# Patient Record
Sex: FEMALE | Race: WHITE | Employment: UNEMPLOYED | URBAN - NONMETROPOLITAN AREA
[De-identification: names, ages, dates, MRNs, and addresses within clinical notes are randomized per-mention and may not be internally consistent; named-entity substitution may affect disease eponyms.]

---

## 2023-07-12 ENCOUNTER — NURSE ONLY (OUTPATIENT)
Dept: LAB | Age: 33
End: 2023-07-12

## 2023-07-17 LAB
C. TRACHOMATIS DNA,THIN PREP: NEGATIVE
N. GONORRHOEAE DNA, THIN PREP: NEGATIVE
SOURCE: NORMAL
SPEC CONTAINER SPEC: NORMAL
SPECIMEN SOURCE: NORMAL
T VAGINALIS RRNA SPEC QL NAA+PROBE: NEGATIVE

## 2023-07-19 LAB — CYTOLOGY THIN PREP PAP: NORMAL

## 2024-01-04 ENCOUNTER — HOSPITAL ENCOUNTER (EMERGENCY)
Age: 34
Discharge: HOME | End: 2024-01-04
Payer: COMMERCIAL

## 2024-01-04 VITALS
SYSTOLIC BLOOD PRESSURE: 127 MMHG | OXYGEN SATURATION: 99 % | HEART RATE: 88 BPM | DIASTOLIC BLOOD PRESSURE: 67 MMHG | RESPIRATION RATE: 18 BRPM | TEMPERATURE: 98.4 F

## 2024-01-04 DIAGNOSIS — L72.3 SEBACEOUS CYST: Primary | ICD-10-CM

## 2024-01-04 PROCEDURE — 99213 OFFICE O/P EST LOW 20 MIN: CPT

## 2024-01-04 PROCEDURE — 99202 OFFICE O/P NEW SF 15 MIN: CPT | Performed by: NURSE PRACTITIONER

## 2024-01-04 ASSESSMENT — ENCOUNTER SYMPTOMS
CHEST TIGHTNESS: 0
STRIDOR: 0
CHOKING: 0
APNEA: 0
NAUSEA: 0
WHEEZING: 0
VOMITING: 0
COUGH: 0
SHORTNESS OF BREATH: 0

## 2024-01-04 ASSESSMENT — PAIN - FUNCTIONAL ASSESSMENT: PAIN_FUNCTIONAL_ASSESSMENT: NONE - DENIES PAIN

## 2024-01-04 NOTE — DISCHARGE INSTRUCTIONS
The patient/Patient representative was advised to rest at home and elevate the affected area.  Patient/representative was also advised to apply warm compresses to the affected area up to 15/20 minutes at a time up to 4 times a day.   Patient/Patient representative is also advised to monitor any changes such as increasing redness, pain, development of fever or chills, generalized body aches.  The patient will be given medication and is advised to take as directed.   If the patient develops any changes such as fever not relieved with Motrin and Tylenol chest pain or shortness of breath patient is to dial 911 or go directly to the emergency room for reevaluation and further management of care.  The patient does not experience any of these symptoms or concerns.  Follow-up with her primary care provider in 2-3 days for reevaluation.  The patient/Patient representatives are agreeable to treatment plan at this time and the patient left in stable condition.

## 2024-01-04 NOTE — ED TRIAGE NOTES
To room with c/o \"lump\" right posterior rib cage she noticed Tuesday. Little to no pain. Saw her OBYN today and they suggested she have it evaluated.

## 2024-01-04 NOTE — ED PROVIDER NOTES
[facility] URGENT CARE  Urgent Care Encounter      CHIEF COMPLAINT       Chief Complaint   Patient presents with    Mass       Nurses Notes reviewed and I agree except as noted in the HPI.  HISTORY OFPRESENT ILLNESS   Dedra Beaulieu is a 33 y.o.  The history is provided by the patient. No  was used.   Abscess  Location:  Torso  Torso abscess location:  R flank  Size:  3 cm  Abscess quality: induration    Abscess quality: not draining, no fluctuance, no itching, not painful, no redness, no warmth and not weeping    Red streaking: no    Duration:  2 days  Progression:  Unchanged  Chronicity:  New  Context: not diabetes, not immunosuppression, not injected drug use, not insect bite/sting and not skin injury    Relieved by:  Nothing  Worsened by:  Nothing  Ineffective treatments:  None tried  Associated symptoms: no anorexia, no fatigue, no fever, no headaches, no nausea and no vomiting    Risk factors: no family hx of MRSA, no hx of MRSA and no prior abscess        REVIEW OF SYSTEMS     Review of Systems   Constitutional:  Negative for activity change, appetite change, chills, diaphoresis, fatigue and fever.   Respiratory:  Negative for apnea, cough, choking, chest tightness, shortness of breath, wheezing and stridor.    Cardiovascular:  Negative for chest pain, palpitations and leg swelling.   Gastrointestinal:  Negative for anorexia, nausea and vomiting.   Neurological:  Negative for headaches.       PAST MEDICAL HISTORY   History reviewed. No pertinent past medical history.    SURGICAL HISTORY     Patient  has a past surgical history that includes Dilation and curettage of uterus (N/A, 2/26/2023).    CURRENT MEDICATIONS       Previous Medications    PRENATAL MV-MIN-FE FUM-FA-DHA (PRENATAL 1 PO)    Take by mouth       ALLERGIES     Patient is has No Known Allergies.    FAMILY HISTORY     Patient's family history includes Depression in her mother; Diabetes in her mother; High Blood

## 2024-01-08 ENCOUNTER — OFFICE VISIT (OUTPATIENT)
Dept: FAMILY MEDICINE CLINIC | Age: 34
End: 2024-01-08
Payer: COMMERCIAL

## 2024-01-08 VITALS
DIASTOLIC BLOOD PRESSURE: 70 MMHG | HEIGHT: 65 IN | HEART RATE: 91 BPM | TEMPERATURE: 97.7 F | BODY MASS INDEX: 41.22 KG/M2 | WEIGHT: 247.4 LBS | RESPIRATION RATE: 16 BRPM | OXYGEN SATURATION: 98 % | SYSTOLIC BLOOD PRESSURE: 110 MMHG

## 2024-01-08 DIAGNOSIS — M54.50 ACUTE BILATERAL LOW BACK PAIN, UNSPECIFIED WHETHER SCIATICA PRESENT: ICD-10-CM

## 2024-01-08 DIAGNOSIS — Z3A.34 34 WEEKS GESTATION OF PREGNANCY: Primary | ICD-10-CM

## 2024-01-08 DIAGNOSIS — L72.3 SEBACEOUS CYST: ICD-10-CM

## 2024-01-08 DIAGNOSIS — R42 DIZZINESS: ICD-10-CM

## 2024-01-08 DIAGNOSIS — R10.84 GENERALIZED ABDOMINAL PAIN: ICD-10-CM

## 2024-01-08 LAB
BILIRUBIN URINE: NEGATIVE
BLOOD URINE, POC: NEGATIVE
CHARACTER, URINE: ABNORMAL
CHP ED QC CHECK: NORMAL
COLOR, URINE: ABNORMAL
GLUCOSE BLD-MCNC: 141 MG/DL
GLUCOSE URINE: NEGATIVE MG/DL
KETONES, URINE: 15
LEUKOCYTE CLUMPS, URINE: NEGATIVE
NITRITE, URINE: NEGATIVE
PH, URINE: 5.5 (ref 5–9)
PROTEIN, URINE: 100 MG/DL
SPECIFIC GRAVITY, URINE: >= 1.03 (ref 1–1.03)
UROBILINOGEN, URINE: 0.2 EU/DL (ref 0–1)

## 2024-01-08 PROCEDURE — G8427 DOCREV CUR MEDS BY ELIG CLIN: HCPCS

## 2024-01-08 PROCEDURE — G8419 CALC BMI OUT NRM PARAM NOF/U: HCPCS

## 2024-01-08 PROCEDURE — 82962 GLUCOSE BLOOD TEST: CPT

## 2024-01-08 PROCEDURE — 99214 OFFICE O/P EST MOD 30 MIN: CPT

## 2024-01-08 PROCEDURE — G8484 FLU IMMUNIZE NO ADMIN: HCPCS

## 2024-01-08 PROCEDURE — 1036F TOBACCO NON-USER: CPT

## 2024-01-08 SDOH — ECONOMIC STABILITY: FOOD INSECURITY: WITHIN THE PAST 12 MONTHS, YOU WORRIED THAT YOUR FOOD WOULD RUN OUT BEFORE YOU GOT MONEY TO BUY MORE.: NEVER TRUE

## 2024-01-08 SDOH — ECONOMIC STABILITY: HOUSING INSECURITY
IN THE LAST 12 MONTHS, WAS THERE A TIME WHEN YOU DID NOT HAVE A STEADY PLACE TO SLEEP OR SLEPT IN A SHELTER (INCLUDING NOW)?: NO

## 2024-01-08 SDOH — ECONOMIC STABILITY: FOOD INSECURITY: WITHIN THE PAST 12 MONTHS, THE FOOD YOU BOUGHT JUST DIDN'T LAST AND YOU DIDN'T HAVE MONEY TO GET MORE.: NEVER TRUE

## 2024-01-08 SDOH — ECONOMIC STABILITY: INCOME INSECURITY: HOW HARD IS IT FOR YOU TO PAY FOR THE VERY BASICS LIKE FOOD, HOUSING, MEDICAL CARE, AND HEATING?: NOT HARD AT ALL

## 2024-01-08 ASSESSMENT — PATIENT HEALTH QUESTIONNAIRE - PHQ9
2. FEELING DOWN, DEPRESSED OR HOPELESS: 1
SUM OF ALL RESPONSES TO PHQ QUESTIONS 1-9: 1
SUM OF ALL RESPONSES TO PHQ9 QUESTIONS 1 & 2: 1
SUM OF ALL RESPONSES TO PHQ QUESTIONS 1-9: 1
SUM OF ALL RESPONSES TO PHQ QUESTIONS 1-9: 1
1. LITTLE INTEREST OR PLEASURE IN DOING THINGS: 0
SUM OF ALL RESPONSES TO PHQ QUESTIONS 1-9: 1

## 2024-01-08 NOTE — PROGRESS NOTES
S: 33 y.o. female with   Chief Complaint   Patient presents with    ED Follow-up     [facility] 2024 Sebaceous cyst also having mid back and now coming around to abdomen and also feeling faint      34 yo  31 weeks pregnant here for follow up cyst- upper back under ribs- non infected.  Was evaluated in UC last week.  Today with some back pain radiating bilaterally to the lower abdomen, constant.  No bleeding/LOF, no urinary symptoms.  Mild nausea.      Reviewed hx and ROS in detail with resident prior to exam.  See notes for additional details.     BP Readings from Last 3 Encounters:   24 110/70   24 127/67   23 122/76     Wt Readings from Last 3 Encounters:   24 112.2 kg (247 lb 6.4 oz)   23 100.2 kg (221 lb)   23 100.2 kg (221 lb)           O: VS:  height is 1.651 m (5' 5\") and weight is 112.2 kg (247 lb 6.4 oz). Her oral temperature is 97.7 °F (36.5 °C). Her blood pressure is 110/70 and her pulse is 91. Her respiration is 16 and oxygen saturation is 98%.   AAO/NAD, appropriate affect for mood     Diagnosis Orders   1. 34 weeks gestation of pregnancy        2. Acute bilateral low back pain, unspecified whether sciatica present        3. Generalized abdominal pain            Plan    Low back pain, decreased fetal movement today, mild nausea.  Mild proteinuria on UA here in office, normal BP.  Hx of preeclampsia in prior pregnancy.  Pt's OB contacted by Dr. He for follow up today- either in OB office for NST or in L+D today.  +FHT in office today.     Health Maintenance Due   Topic Date Due    COVID-19 Vaccine (1) Never done    Varicella vaccine (1 of 2 - 2-dose childhood series) Never done    Depression Screen  Never done    Hepatitis B vaccine (2 of 3 - 3-dose series) 06/10/2002    HIV screen  Never done    Hepatitis C screen  Never done    DTaP/Tdap/Td vaccine (1 - Tdap) Never done    Flu vaccine (1) Never done         Attending Physician Statement  I

## 2024-01-08 NOTE — PROGRESS NOTES
Patient:Dedra Beaulieu Sex: female  YOB: 1990 Age:33 y.o.  MRN: [id number]    HPI   Chief Complaint: ED Follow-up ([facility] 2024 Sebaceous cyst also having mid back and now coming around to abdomen and also feeling faint )    HPI 32 yo currently pregnant  at 31w3d without significant PMHx here for ED follow up for sebaceous cyst. Says it first developed last Tuesday on her R upper back. Says non painful and never had this before. Sees [facility] for OB. . Denies complications this pregnancy. Some concern for diabetes. Tried to do the 3 hr GTT but got sick after the first hour. Is supposed to check sugars.     Does also note some tightening around her belly today. Pain started in her back this morning that wraps around to her abdomen, constant. No vaginal bleeding or LOF Also has some nausea. Was induced at 34 weeks in first pregnancy for preeclampsia. Took ASA in the beginning of pregnancy but hasn't been taking. Denies HA, blurry vision, RUQ pain. Some dizziness, nausea, weakness since getting to the office ~945. Denies urinary symptoms. Swelling in legs/hands. Ate ~ 8 am.     Patient History    Past Medical History:  Patient has no past medical history on file.    Social History:  Patient reports that she has never smoked. She has never used smokeless tobacco. She reports that she does not currently use alcohol. She reports that she does not use drugs.     Past Surgical History:   Procedure Laterality Date    DILATION AND CURETTAGE OF UTERUS N/A 2023    DILATATION AND CURETTAGE SUCTION performed by Sharri Sol MD at [facility] OR      Family History   Problem Relation Age of Onset    Depression Mother     Diabetes Mother     High Blood Pressure Mother      Review of Systems   Review of Systems   Constitutional:  Negative for activity change, chills, fever and unexpected weight change.   HENT:  Positive for congestion. Negative for rhinorrhea and sore throat.    Eyes:  Negative

## 2024-01-08 NOTE — PATIENT INSTRUCTIONS
Try tylenol for pain, keep well hydrated    Do kick counts and if by midafternoon still not feeling baby move as much, call OB office to see if you should go in    Can also get belly belt for back support    Ultrasound ordered for cyst on R side of back -- let us know if you don't hear from them to schedule in the next week or two    I will call you with results, otherwise plan to follow up after pregnancy to address cyst -- let me know if getting larger or painful or fevers/ chills/signs of infection develop

## 2024-01-09 ENCOUNTER — HOSPITAL ENCOUNTER (OUTPATIENT)
Age: 34
Discharge: HOME | End: 2024-01-10
Attending: STUDENT IN AN ORGANIZED HEALTH CARE EDUCATION/TRAINING PROGRAM | Admitting: STUDENT IN AN ORGANIZED HEALTH CARE EDUCATION/TRAINING PROGRAM
Payer: COMMERCIAL

## 2024-01-09 ENCOUNTER — APPOINTMENT (OUTPATIENT)
Dept: ULTRASOUND IMAGING | Age: 34
End: 2024-01-09
Payer: COMMERCIAL

## 2024-01-09 ENCOUNTER — APPOINTMENT (OUTPATIENT)
Dept: MRI IMAGING | Age: 34
End: 2024-01-09
Payer: COMMERCIAL

## 2024-01-09 PROBLEM — R10.9 FLANK PAIN: Status: ACTIVE | Noted: 2024-01-09

## 2024-01-09 LAB
ALBUMIN SERPL BCG-MCNC: 3.3 G/DL (ref 3.5–5.1)
ALP SERPL-CCNC: 74 U/L (ref 38–126)
ALT SERPL W/O P-5'-P-CCNC: < 5 U/L (ref 11–66)
AMORPH SED URNS QL MICRO: ABNORMAL
ANION GAP SERPL CALC-SCNC: 13 MEQ/L (ref 8–16)
AST SERPL-CCNC: 11 U/L (ref 5–40)
BACTERIA URNS QL MICRO: ABNORMAL /HPF
BILIRUB SERPL-MCNC: 0.8 MG/DL (ref 0.3–1.2)
BILIRUB UR QL STRIP.AUTO: NEGATIVE
BUN SERPL-MCNC: 5 MG/DL (ref 7–22)
CALCIUM SERPL-MCNC: 9 MG/DL (ref 8.5–10.5)
CASTS #/AREA URNS LPF: ABNORMAL /LPF
CASTS 2: ABNORMAL /LPF
CHARACTER UR: ABNORMAL
CHLORIDE SERPL-SCNC: 103 MEQ/L (ref 98–111)
CO2 SERPL-SCNC: 21 MEQ/L (ref 23–33)
COLOR: YELLOW
CREAT SERPL-MCNC: 0.5 MG/DL (ref 0.4–1.2)
CRYSTALS URNS MICRO: ABNORMAL
DEPRECATED RDW RBC AUTO: 48.3 FL (ref 35–45)
EPITHELIAL CELLS, UA: ABNORMAL /HPF
ERYTHROCYTE [DISTWIDTH] IN BLOOD BY AUTOMATED COUNT: 14.6 % (ref 11.5–14.5)
GFR SERPL CREATININE-BSD FRML MDRD: > 60 ML/MIN/1.73M2
GLUCOSE SERPL-MCNC: 94 MG/DL (ref 70–108)
GLUCOSE UR QL STRIP.AUTO: NEGATIVE MG/DL
HCT VFR BLD AUTO: 34.1 % (ref 37–47)
HGB BLD-MCNC: 11.1 GM/DL (ref 12–16)
HGB UR QL STRIP.AUTO: NEGATIVE
KETONES UR QL STRIP.AUTO: 80
MCH RBC QN AUTO: 29.8 PG (ref 26–33)
MCHC RBC AUTO-ENTMCNC: 32.6 GM/DL (ref 32.2–35.5)
MCV RBC AUTO: 91.4 FL (ref 81–99)
MISCELLANEOUS 2: ABNORMAL
MUCOUS THREADS URNS QL MICRO: ABNORMAL
NITRITE UR QL STRIP: NEGATIVE
PH UR STRIP.AUTO: 6 [PH] (ref 5–9)
PLATELET # BLD AUTO: 302 THOU/MM3 (ref 130–400)
PMV BLD AUTO: 9.3 FL (ref 9.4–12.4)
POTASSIUM SERPL-SCNC: 3.7 MEQ/L (ref 3.5–5.2)
PROT SERPL-MCNC: 6.1 G/DL (ref 6.1–8)
PROT UR STRIP.AUTO-MCNC: ABNORMAL MG/DL
RBC # BLD AUTO: 3.73 MILL/MM3 (ref 4.2–5.4)
RBC URINE: ABNORMAL /HPF
REASON FOR REJECTION: NORMAL
REJECTED TEST: NORMAL
RENAL EPI CELLS #/AREA URNS HPF: ABNORMAL /[HPF]
SODIUM SERPL-SCNC: 137 MEQ/L (ref 135–145)
SP GR UR REFRACT.AUTO: 1.02 (ref 1–1.03)
UROBILINOGEN, URINE: 0.2 EU/DL (ref 0–1)
WBC # BLD AUTO: 12.2 THOU/MM3 (ref 4.8–10.8)
WBC #/AREA URNS HPF: ABNORMAL /HPF
WBC #/AREA URNS HPF: NEGATIVE /[HPF]
YEAST LIKE FUNGI URNS QL MICRO: ABNORMAL

## 2024-01-09 PROCEDURE — 74181 MRI ABDOMEN W/O CONTRAST: CPT

## 2024-01-09 PROCEDURE — 85027 COMPLETE CBC AUTOMATED: CPT

## 2024-01-09 PROCEDURE — 72195 MRI PELVIS W/O DYE: CPT

## 2024-01-09 PROCEDURE — 6360000002 HC RX W HCPCS: Performed by: STUDENT IN AN ORGANIZED HEALTH CARE EDUCATION/TRAINING PROGRAM

## 2024-01-09 PROCEDURE — 36415 COLL VENOUS BLD VENIPUNCTURE: CPT

## 2024-01-09 PROCEDURE — 2580000003 HC RX 258: Performed by: STUDENT IN AN ORGANIZED HEALTH CARE EDUCATION/TRAINING PROGRAM

## 2024-01-09 PROCEDURE — 81001 URINALYSIS AUTO W/SCOPE: CPT

## 2024-01-09 PROCEDURE — 76770 US EXAM ABDO BACK WALL COMP: CPT

## 2024-01-09 PROCEDURE — 99222 1ST HOSP IP/OBS MODERATE 55: CPT

## 2024-01-09 PROCEDURE — 6370000000 HC RX 637 (ALT 250 FOR IP): Performed by: STUDENT IN AN ORGANIZED HEALTH CARE EDUCATION/TRAINING PROGRAM

## 2024-01-09 PROCEDURE — 80053 COMPREHEN METABOLIC PANEL: CPT

## 2024-01-09 RX ORDER — ZOLPIDEM TARTRATE 10 MG/1
10 TABLET ORAL NIGHTLY PRN
Status: DISCONTINUED | OUTPATIENT
Start: 2024-01-09 | End: 2024-01-10 | Stop reason: HOSPADM

## 2024-01-09 RX ORDER — MEPERIDINE HYDROCHLORIDE 50 MG/ML
50 INJECTION INTRAMUSCULAR; INTRAVENOUS; SUBCUTANEOUS ONCE
Status: COMPLETED | OUTPATIENT
Start: 2024-01-09 | End: 2024-01-09

## 2024-01-09 RX ORDER — HYDROCODONE BITARTRATE AND ACETAMINOPHEN 5; 325 MG/1; MG/1
2 TABLET ORAL EVERY 6 HOURS PRN
Status: DISCONTINUED | OUTPATIENT
Start: 2024-01-09 | End: 2024-01-10 | Stop reason: HOSPADM

## 2024-01-09 RX ORDER — PROMETHAZINE HYDROCHLORIDE 25 MG/ML
50 INJECTION, SOLUTION INTRAMUSCULAR; INTRAVENOUS ONCE
Status: COMPLETED | OUTPATIENT
Start: 2024-01-09 | End: 2024-01-09

## 2024-01-09 RX ORDER — SODIUM CHLORIDE, SODIUM LACTATE, POTASSIUM CHLORIDE, CALCIUM CHLORIDE 600; 310; 30; 20 MG/100ML; MG/100ML; MG/100ML; MG/100ML
INJECTION, SOLUTION INTRAVENOUS CONTINUOUS
Status: DISCONTINUED | OUTPATIENT
Start: 2024-01-09 | End: 2024-01-10 | Stop reason: HOSPADM

## 2024-01-09 RX ORDER — SODIUM CHLORIDE, SODIUM LACTATE, POTASSIUM CHLORIDE, AND CALCIUM CHLORIDE .6; .31; .03; .02 G/100ML; G/100ML; G/100ML; G/100ML
500 INJECTION, SOLUTION INTRAVENOUS ONCE
Status: DISCONTINUED | OUTPATIENT
Start: 2024-01-09 | End: 2024-01-10 | Stop reason: HOSPADM

## 2024-01-09 RX ADMIN — PROMETHAZINE HYDROCHLORIDE 50 MG: 25 INJECTION INTRAMUSCULAR; INTRAVENOUS at 15:15

## 2024-01-09 RX ADMIN — HYDROCODONE BITARTRATE AND ACETAMINOPHEN 2 TABLET: 5; 325 TABLET ORAL at 22:38

## 2024-01-09 RX ADMIN — HYDROCODONE BITARTRATE AND ACETAMINOPHEN 2 TABLET: 5; 325 TABLET ORAL at 13:21

## 2024-01-09 RX ADMIN — SODIUM CHLORIDE, POTASSIUM CHLORIDE, SODIUM LACTATE AND CALCIUM CHLORIDE: 600; 310; 30; 20 INJECTION, SOLUTION INTRAVENOUS at 14:13

## 2024-01-09 RX ADMIN — SODIUM CHLORIDE, POTASSIUM CHLORIDE, SODIUM LACTATE AND CALCIUM CHLORIDE: 600; 310; 30; 20 INJECTION, SOLUTION INTRAVENOUS at 13:12

## 2024-01-09 RX ADMIN — MEPERIDINE HYDROCHLORIDE 50 MG: 50 INJECTION, SOLUTION INTRAMUSCULAR; INTRAVENOUS; SUBCUTANEOUS at 15:15

## 2024-01-09 ASSESSMENT — ENCOUNTER SYMPTOMS
SHORTNESS OF BREATH: 0
ABDOMINAL DISTENTION: 0
CONSTIPATION: 0
DIARRHEA: 0
NAUSEA: 0
SORE THROAT: 0
BLOOD IN STOOL: 0
WHEEZING: 0
RHINORRHEA: 0
VOMITING: 0
PHOTOPHOBIA: 0
ABDOMINAL PAIN: 1

## 2024-01-09 ASSESSMENT — PAIN DESCRIPTION - ORIENTATION: ORIENTATION: LEFT

## 2024-01-09 ASSESSMENT — PAIN SCALES - GENERAL
PAINLEVEL_OUTOF10: 7
PAINLEVEL_OUTOF10: 8

## 2024-01-09 ASSESSMENT — PAIN - FUNCTIONAL ASSESSMENT: PAIN_FUNCTIONAL_ASSESSMENT: ACTIVITIES ARE NOT PREVENTED

## 2024-01-09 ASSESSMENT — PAIN DESCRIPTION - LOCATION: LOCATION: FLANK

## 2024-01-09 ASSESSMENT — PAIN DESCRIPTION - DESCRIPTORS: DESCRIPTORS: SHARP

## 2024-01-09 NOTE — FLOWSHEET NOTE
Dr. Pena asked if patient can come off EFM, she states as long as baby has been reactive, ok to removed EFM for this time.

## 2024-01-09 NOTE — FLOWSHEET NOTE
Spoke with Dr. Pena on the phone and updated her that US was read and labs were back, patient has not got her mepergan yet at this time, waiting for it from pharmacy.  States she is going to reach out to urology and see what they might recommend.

## 2024-01-09 NOTE — CONSULTS
bilateral renal ultrasound.  2. Nondistended urinary bladder.    IMPRESSION/Plan  Left Flank Pain  31 Weeks Gestation     -unremarkable renal US. UA with 5-10 RBC/hpf, negative nitrites, and negative leukocytes.   -CMP pending   -Can consider MRI to assess for hydronephrosis more thoroughly, a KUB, or low dose CT. OB would prefer to avoid low dose CT scan, unless absolutely necessary. I discussed the risks and benefits of each test with the patient. At this time, will proceed with MRI of abdomen and pelvis WO contrast.  -At this time, urology recommend against surgical intervention for a stone, unless the patient were to become septic.       -case discussed with Dr. Moreira     Thank you for including us in the care of Dedra Angeles PA-C  01/09/24 2:54 PM  Urology

## 2024-01-09 NOTE — FLOWSHEET NOTE
Dr. Pena on the unit and updated that patient is not getting pain relief from PO med at this time. Order received.

## 2024-01-09 NOTE — FLOWSHEET NOTE
Patient arrived to the unit today with the complaint of sever back pain that started yesterday, states she was seen in the office yesterday for other complaints but this pain is getting so bad now that she is not taking deep breaths because it hurts. Patient states that she is feeling the baby move as usual and denies any leaking of fluids. States she is feeling abd tightening but doesn't know if they are contractions or not. Patient shown to the RR for voiding and changing into gown.

## 2024-01-10 ENCOUNTER — TELEPHONE (OUTPATIENT)
Dept: FAMILY MEDICINE CLINIC | Age: 34
End: 2024-01-10

## 2024-01-10 ENCOUNTER — TELEPHONE (OUTPATIENT)
Dept: UROLOGY | Age: 34
End: 2024-01-10

## 2024-01-10 VITALS
BODY MASS INDEX: 41.19 KG/M2 | HEIGHT: 65 IN | RESPIRATION RATE: 16 BRPM | DIASTOLIC BLOOD PRESSURE: 76 MMHG | TEMPERATURE: 97.3 F | SYSTOLIC BLOOD PRESSURE: 137 MMHG | OXYGEN SATURATION: 96 % | WEIGHT: 247.2 LBS | HEART RATE: 92 BPM

## 2024-01-10 PROCEDURE — 99231 SBSQ HOSP IP/OBS SF/LOW 25: CPT | Performed by: UROLOGY

## 2024-01-10 PROCEDURE — 2580000003 HC RX 258: Performed by: STUDENT IN AN ORGANIZED HEALTH CARE EDUCATION/TRAINING PROGRAM

## 2024-01-10 PROCEDURE — 6370000000 HC RX 637 (ALT 250 FOR IP): Performed by: STUDENT IN AN ORGANIZED HEALTH CARE EDUCATION/TRAINING PROGRAM

## 2024-01-10 RX ORDER — CYCLOBENZAPRINE HCL 10 MG
10 TABLET ORAL 2 TIMES DAILY PRN
Qty: 28 TABLET | Refills: 0 | Status: SHIPPED | OUTPATIENT
Start: 2024-01-10 | End: 2024-01-24

## 2024-01-10 RX ORDER — CYCLOBENZAPRINE HCL 10 MG
10 TABLET ORAL ONCE
Status: COMPLETED | OUTPATIENT
Start: 2024-01-10 | End: 2024-01-10

## 2024-01-10 RX ADMIN — CYCLOBENZAPRINE HYDROCHLORIDE 10 MG: 10 TABLET, FILM COATED ORAL at 09:19

## 2024-01-10 RX ADMIN — SODIUM CHLORIDE, POTASSIUM CHLORIDE, SODIUM LACTATE AND CALCIUM CHLORIDE: 600; 310; 30; 20 INJECTION, SOLUTION INTRAVENOUS at 01:03

## 2024-01-10 ASSESSMENT — PAIN DESCRIPTION - ORIENTATION: ORIENTATION: LEFT

## 2024-01-10 ASSESSMENT — PAIN SCALES - GENERAL: PAINLEVEL_OUTOF10: 5

## 2024-01-10 ASSESSMENT — PAIN - FUNCTIONAL ASSESSMENT: PAIN_FUNCTIONAL_ASSESSMENT: PREVENTS OR INTERFERES SOME ACTIVE ACTIVITIES AND ADLS

## 2024-01-10 ASSESSMENT — PAIN DESCRIPTION - DESCRIPTORS: DESCRIPTORS: DULL

## 2024-01-10 ASSESSMENT — PAIN DESCRIPTION - LOCATION: LOCATION: FLANK

## 2024-01-10 NOTE — PROGRESS NOTES
Patient's MRI pelvis without contrast and abdomen without contrast unremarkable, no signs of urinary tract calculi or obstruction.  Patient states pain is increasing, required Norco.  BP under control  NST reactive, fetal heart rate 145, no contractions  Zolpidem to help with sleep  Will observe patient overnight, provide adequate pain control.    An electronic signature was used to authenticate this note  - Viv Pacheco MD PGY-1 on 1/9/2024 at 11:13 PM

## 2024-01-10 NOTE — FLOWSHEET NOTE
Discussed imaging results and patient's concerns with Dr. BARNEY Acevedo. Patient concerned about keeping her pain controlled overnight. Physician agreed to observe patient overnight. Also verified that 20 min observation of NST is appropriate for patient this evening.

## 2024-01-10 NOTE — PROGRESS NOTES
Patient is a 33-year-old female -1-1-1, gestational age 31 weeks 4 days.  Complains of left-sided flank/lower back pain, denies any dysuria, Low back pain nontender to palpation.  Urology consulted.    Vitals afebrile, respiratory rate 16, pulse 86, blood pressure 135/60.    Pain adequately controlled, pain 5 out of 10 currently.  Pending MRI results, heat pack for comfort.

## 2024-01-10 NOTE — FLOWSHEET NOTE
Dr. Pena called for update. Notified that patient stated she is feeling better this morning. Upon morning assessment patient rated pain 5/10, however, denied need for prn pain medication. Patient encouraged to order breakfast. Currently on EFM, reactive NST obtained. Dr. Pena stated she will be in to see patient.

## 2024-01-10 NOTE — TELEPHONE ENCOUNTER
31 wk preg  Consult for flank pain  MRI wo stone, hydro  Needs OV after she delivers with BMP if still having pain

## 2024-01-10 NOTE — PLAN OF CARE
Problem: Pain  Goal: Verbalizes/displays adequate comfort level or baseline comfort level  1/10/2024 0846 by Gertrude Whitfield, RN  Outcome: Progressing  Flowsheets (Taken 1/10/2024 0846)  Verbalizes/displays adequate comfort level or baseline comfort level:   Encourage patient to monitor pain and request assistance   Assess pain using appropriate pain scale   Implement non-pharmacological measures as appropriate and evaluate response  Note: Patient denies need for for prn pain medication at this time.  1/9/2024 2021 by Rupal Laguerre, RN  Outcome: Progressing  Flowsheets (Taken 1/9/2024 2021)  Verbalizes/displays adequate comfort level or baseline comfort level:   Encourage patient to monitor pain and request assistance   Administer analgesics based on type and severity of pain and evaluate response   Consider cultural and social influences on pain and pain management   Implement non-pharmacological measures as appropriate and evaluate response   Notify Licensed Independent Practitioner if interventions unsuccessful or patient reports new pain   Assess pain using appropriate pain scale     Problem: Discharge Planning  Goal: Discharge to home or other facility with appropriate resources  Outcome: Progressing  Flowsheets (Taken 1/10/2024 0846)  Discharge to home or other facility with appropriate resources:   Identify barriers to discharge with patient and caregiver   Arrange for needed discharge resources and transportation as appropriate   Identify discharge learning needs (meds, wound care, etc)    Care plan reviewed with patient and spouse.  Patient and spouse verbalize understanding of the plan of care and contribute to goal setting.        
  Problem: Pain  Goal: Verbalizes/displays adequate comfort level or baseline comfort level  Outcome: Progressing  Flowsheets (Taken 1/9/2024 2021)  Verbalizes/displays adequate comfort level or baseline comfort level:   Encourage patient to monitor pain and request assistance   Administer analgesics based on type and severity of pain and evaluate response   Consider cultural and social influences on pain and pain management   Implement non-pharmacological measures as appropriate and evaluate response   Notify Licensed Independent Practitioner if interventions unsuccessful or patient reports new pain   Assess pain using appropriate pain scale   Care plan reviewed with patient and family.  Patient and family verbalize understanding of the plan of care and contribute to goal setting.     
No

## 2024-01-10 NOTE — FLOWSHEET NOTE
Dr Pena sent message that pt said flexeril worked well. Pt desired pharmacy is [facility] [address].

## 2024-01-10 NOTE — DISCHARGE INSTRUCTIONS
Home Undelivered Discharge Instructions    After Discharge Orders:           Diet: regular diet   Increase fluid intake to 8-10 glasses of water daily    Rest: normal activity as tolerated    Other instructions: Do kick counts once a day on your baby. Choose the time of day your baby is most active. Get in a comfortable lying or sitting position and time how long it takes to feel 10 kicks, twists, turns, swishes, or rolls.     Call Your Doctor if Any of the Following Occurs   Leaking fluid from vagina with or without contractions  Bright red vaginal bleeding occurs that is as heavy as or heavier than a period  Regular contractions are longer, stronger, and closer together  Noticeable decreased fetal movement  Elevated temperature >100.5°F and chills  Blurred vision, spots before eyes, unrelievable headache, severe facial swelling, or upper abdominal pain  Contact physician or hospital OB unit if signs of premature labor occur at ?36 weeks  Persistent or rhythmic low back pain that feels different than you are used to  Menstrual like cramps  Intestinal cramps with or without diarrhea  Pelvic pressure or rhythmic tightening that feels different than you are used to  Watery discharge or a gush of fluid from your vagina  Vaginal bleeding as heavy as a period  General Information     Difference between:  False Labor True Labor   1. Contractions often are irregular and don't consistently get closer together (called Sebastian-Gibson contractions) 1. Contractions come at regular intervals and, as time goes on, get closer together.   2. Contractions may often stop when you rest or with a position change. 2. Contractions continue despite movement or walking. Contractions get stronger and closer together with time.   3. Often felt in the lower abdomen. 3. Usually felt in back coming around to the front.     Reminder to Patient   Please bring all teaching sheets and discharge information with you if you return to the hospital or

## 2024-01-10 NOTE — H&P
[facility]  History and Physicial      Patient:  Dedra Beaulieu  YOB: 1990  MRN: [id number]     Acct: [id number]    HISTORY OF PRESENT ILLNESS:     Dedra Beaulieu is a 33 y.o. female  @31w5d presented yesterday afternoon with complaint of severe left back/flank pain. Reported some occasional abdominal tightening as well. Denies VB. Reports good FM. Denies dysuria or hematuria.  Yesterday pt was worked up for suspected kidney stone and workup was negative. This morning pt says pain has improved to 5/10 and is tolerable.     Obstetric History: # 1 - Date: None, Sex: Female, Weight: None, GA: None, Delivery: None, Apgar1: None, Apgar5: None, Living: None, Birth Comments: None    # 2 - Date: None, Sex: None, Weight: None, GA: None, Delivery: Spontaneous , Apgar1: None, Apgar5: None, Living: None, Birth Comments: None    # 3 - Date: None, Sex: None, Weight: None, GA: None, Delivery: None, Apgar1: None, Apgar5: None, Living: None, Birth Comments: None      Past Medical History:        Diagnosis Date    Gestational HTN     Hypertension        Past Surgical History:        Procedure Laterality Date    DILATION AND CURETTAGE OF UTERUS N/A 2023    DILATATION AND CURETTAGE SUCTION performed by Sharri Sol MD at [facility] OR       Medications: Scheduled Meds:   cyclobenzaprine  10 mg Oral Once    lactated ringers bolus  500 mL IntraVENous Once     Continuous Infusions:   lactated ringers IV soln 125 mL/hr at 01/10/24 0730     PRN Meds:.HYDROcodone 5 mg - acetaminophen, zolpidem    Allergies:  Patient has no known allergies.    Social History:    reports that she has never smoked. She has never used smokeless tobacco. She reports that she does not currently use alcohol. She reports that she does not use drugs.    Family History:       Problem Relation Age of Onset    Depression Mother     Diabetes Mother     High Blood Pressure Mother        Review of Systems:  General ROS:

## 2024-01-10 NOTE — FLOWSHEET NOTE
Patient given flexeril per orders. RN to monitor patients response to medication. If patient is feeling better within the hour may be discharged home. Orders to remove INT obtained. Patient updated on plans for discharge.

## 2024-01-10 NOTE — FLOWSHEET NOTE
Pt states the flexeril she was given has seemed to help. Denies pain at this time.     Discharge instructions given and reviewed with patient.  Informed patient to notify physican or come back to L & D if leaking fluid from vagina with or without contractions.  Bright red vaginal bleeding occurs that is as heavy as or heavier than a period.  Regular contractions that are 2-5 minutes apart that are longer, stronger, and closer together.  Decreased fetal movement.  Elevated temperature >100.5°F and chills.  Blurred vision, spots before eyes, unrelievable headache, severe facial swelling, or upper abdominal pain.  Questions denied, papers signed.

## 2024-01-10 NOTE — PROGRESS NOTES
Urology Progress Note    Reason for Consult:  L Flank Pain  Requesting Physician:  Dr. Pena      History Obtained From:  patient, electronic medical record     Chief Complaint: 31 weeks pregnant with L Flank Pain    Subjective: \"    Patient is resting in bed, voiding spontaneously, ambulating with assistance, tolerating regular diet, denies any nausea or vomiting. There are complaints of controlled pain at this time.    MRI wo stone or hydronephrosis  Pain is unlikely  related    IMPRESSION/Plan  Left Flank Pain  31 Weeks Gestation      -unremarkable renal US. UA with 5-10 RBC/hpf, negative nitrites, and negative leukocytes.   -CRT at baseline       MRI abdomen without contrast:    Comparison: MRI [id number]    Findings:    There is a gravid uterus with single gestation in vertex position. The  placenta is fundal.  The visualized portions of liver and spleen are unremarkable.  The gallbladder is normal in size. No calculi. No biliary dilatation.  The pancreas and pancreatic duct are unremarkable.  No vascular abnormalities. No adenopathy.    Adrenal glands are normal.  The kidneys are normal in size. There is no hydronephrosis.  No perinephric inflammatory changes.  No signs of urinary tract obstruction or calculi.  The urinary bladder is incompletely filled with normal bladder wall  thickness.    No free intraperitoneal fluid. Bowel caliber is normal.    The distal thoracic and lumbar spine vertebral bodies are in good  alignment. Bone marrow signal intensity is normal.  Left and right hip joints are incidentally unremarkable.  No abdominal wall hernias.   Impression:     Impression:    Unremarkable MRI of the abdomen with gravid uterus.  Specifically no signs of urinary tract calculi or obstruction.     URO to follow peripherally, call with questions        Vitals:  /76   Pulse 92   Temp 97.3 °F (36.3 °C) (Temporal)   Resp 16   Ht 1.651 m (5' 5\")   Wt 112.1 kg (247 lb 3.2 oz)   SpO2 96%

## 2024-01-11 NOTE — TELEPHONE ENCOUNTER
Pts due date is March 8th, an appt was made for a couple weeks after that, just incase she does not deliver on her due date-

## 2024-01-15 ENCOUNTER — APPOINTMENT (OUTPATIENT)
Dept: ULTRASOUND IMAGING | Age: 34
End: 2024-01-15
Payer: COMMERCIAL

## 2024-01-15 ENCOUNTER — HOSPITAL ENCOUNTER (OUTPATIENT)
Age: 34
Discharge: HOME | End: 2024-01-15
Attending: STUDENT IN AN ORGANIZED HEALTH CARE EDUCATION/TRAINING PROGRAM | Admitting: STUDENT IN AN ORGANIZED HEALTH CARE EDUCATION/TRAINING PROGRAM
Payer: COMMERCIAL

## 2024-01-15 ENCOUNTER — HOSPITAL ENCOUNTER (EMERGENCY)
Age: 34
Discharge: HOME | End: 2024-01-16
Attending: EMERGENCY MEDICINE
Payer: COMMERCIAL

## 2024-01-15 VITALS
WEIGHT: 250 LBS | TEMPERATURE: 98 F | DIASTOLIC BLOOD PRESSURE: 63 MMHG | OXYGEN SATURATION: 97 % | HEART RATE: 85 BPM | HEIGHT: 65 IN | BODY MASS INDEX: 41.65 KG/M2 | SYSTOLIC BLOOD PRESSURE: 120 MMHG | RESPIRATION RATE: 15 BRPM

## 2024-01-15 VITALS
SYSTOLIC BLOOD PRESSURE: 125 MMHG | TEMPERATURE: 98.8 F | BODY MASS INDEX: 41.65 KG/M2 | OXYGEN SATURATION: 99 % | RESPIRATION RATE: 18 BRPM | DIASTOLIC BLOOD PRESSURE: 76 MMHG | HEART RATE: 78 BPM | WEIGHT: 250 LBS | HEIGHT: 65 IN

## 2024-01-15 DIAGNOSIS — K80.50 BILIARY COLIC: Primary | ICD-10-CM

## 2024-01-15 PROBLEM — O26.90 PREGNANCY COMPLICATION, ANTEPARTUM: Status: ACTIVE | Noted: 2024-01-15

## 2024-01-15 LAB
ALBUMIN SERPL BCG-MCNC: 3.5 G/DL (ref 3.5–5.1)
ALP SERPL-CCNC: 91 U/L (ref 38–126)
ALT SERPL W/O P-5'-P-CCNC: 6 U/L (ref 11–66)
ANION GAP SERPL CALC-SCNC: 19 MEQ/L (ref 8–16)
AST SERPL-CCNC: 13 U/L (ref 5–40)
BACTERIA URNS QL MICRO: ABNORMAL /HPF
BASOPHILS ABSOLUTE: 0 THOU/MM3 (ref 0–0.1)
BASOPHILS NFR BLD AUTO: 0.2 %
BILIRUB CONJ SERPL-MCNC: < 0.2 MG/DL (ref 0–0.3)
BILIRUB SERPL-MCNC: 0.8 MG/DL (ref 0.3–1.2)
BILIRUB UR QL STRIP.AUTO: NEGATIVE
BUN SERPL-MCNC: 5 MG/DL (ref 7–22)
CALCIUM SERPL-MCNC: 9.2 MG/DL (ref 8.5–10.5)
CASTS #/AREA URNS LPF: ABNORMAL /LPF
CASTS 2: ABNORMAL /LPF
CHARACTER UR: ABNORMAL
CHLORIDE SERPL-SCNC: 104 MEQ/L (ref 98–111)
CO2 SERPL-SCNC: 15 MEQ/L (ref 23–33)
COLOR: YELLOW
CREAT SERPL-MCNC: 0.4 MG/DL (ref 0.4–1.2)
CRYSTALS URNS MICRO: ABNORMAL
DEPRECATED RDW RBC AUTO: 45.9 FL (ref 35–45)
EOSINOPHIL NFR BLD AUTO: 0.1 %
EOSINOPHILS ABSOLUTE: 0 THOU/MM3 (ref 0–0.4)
EPITHELIAL CELLS, UA: ABNORMAL /HPF
ERYTHROCYTE [DISTWIDTH] IN BLOOD BY AUTOMATED COUNT: 14.2 % (ref 11.5–14.5)
GFR SERPL CREATININE-BSD FRML MDRD: > 60 ML/MIN/1.73M2
GLUCOSE SERPL-MCNC: 104 MG/DL (ref 70–108)
GLUCOSE UR QL STRIP.AUTO: NEGATIVE MG/DL
HCG INTACT+B SERPL-ACNC: ABNORMAL MIU/ML (ref 0–5)
HCT VFR BLD AUTO: 36.1 % (ref 37–47)
HGB BLD-MCNC: 12.1 GM/DL (ref 12–16)
HGB UR QL STRIP.AUTO: ABNORMAL
IMM GRANULOCYTES # BLD AUTO: 0.18 THOU/MM3 (ref 0–0.07)
IMM GRANULOCYTES NFR BLD AUTO: 1.2 %
KETONES UR QL STRIP.AUTO: >= 160
LIPASE SERPL-CCNC: 37.9 U/L (ref 5.6–51.3)
LYMPHOCYTES ABSOLUTE: 2.2 THOU/MM3 (ref 1–4.8)
LYMPHOCYTES NFR BLD AUTO: 14.7 %
MAGNESIUM SERPL-MCNC: 1.7 MG/DL (ref 1.6–2.4)
MCH RBC QN AUTO: 29.8 PG (ref 26–33)
MCHC RBC AUTO-ENTMCNC: 33.5 GM/DL (ref 32.2–35.5)
MCV RBC AUTO: 88.9 FL (ref 81–99)
MISCELLANEOUS 2: ABNORMAL
MONOCYTES ABSOLUTE: 0.8 THOU/MM3 (ref 0.4–1.3)
MONOCYTES NFR BLD AUTO: 5.5 %
MUCOUS THREADS URNS QL MICRO: ABNORMAL
NEUTROPHILS NFR BLD AUTO: 78.3 %
NITRITE UR QL STRIP: NEGATIVE
NRBC BLD AUTO-RTO: 0 /100 WBC
OSMOLALITY SERPL CALC.SUM OF ELEC: 273.2 MOSMOL/KG (ref 275–300)
PH UR STRIP.AUTO: 5.5 [PH] (ref 5–9)
PLATELET # BLD AUTO: 316 THOU/MM3 (ref 130–400)
PMV BLD AUTO: 9.1 FL (ref 9.4–12.4)
POTASSIUM SERPL-SCNC: 3.6 MEQ/L (ref 3.5–5.2)
PROT SERPL-MCNC: 6.9 G/DL (ref 6.1–8)
PROT UR STRIP.AUTO-MCNC: 100 MG/DL
RBC # BLD AUTO: 4.06 MILL/MM3 (ref 4.2–5.4)
RBC URINE: ABNORMAL /HPF
RENAL EPI CELLS #/AREA URNS HPF: ABNORMAL /[HPF]
SEGMENTED NEUTROPHILS ABSOLUTE COUNT: 11.7 THOU/MM3 (ref 1.8–7.7)
SODIUM SERPL-SCNC: 138 MEQ/L (ref 135–145)
SP GR UR REFRACT.AUTO: > 1.03 (ref 1–1.03)
UROBILINOGEN, URINE: 0.2 EU/DL (ref 0–1)
WBC # BLD AUTO: 15 THOU/MM3 (ref 4.8–10.8)
WBC #/AREA URNS HPF: ABNORMAL /HPF
WBC #/AREA URNS HPF: NEGATIVE /[HPF]
YEAST LIKE FUNGI URNS QL MICRO: ABNORMAL

## 2024-01-15 PROCEDURE — C9113 INJ PANTOPRAZOLE SODIUM, VIA: HCPCS | Performed by: EMERGENCY MEDICINE

## 2024-01-15 PROCEDURE — 2580000003 HC RX 258: Performed by: EMERGENCY MEDICINE

## 2024-01-15 PROCEDURE — 99284 EMERGENCY DEPT VISIT MOD MDM: CPT

## 2024-01-15 PROCEDURE — 96361 HYDRATE IV INFUSION ADD-ON: CPT

## 2024-01-15 PROCEDURE — 76705 ECHO EXAM OF ABDOMEN: CPT

## 2024-01-15 PROCEDURE — 6360000002 HC RX W HCPCS: Performed by: EMERGENCY MEDICINE

## 2024-01-15 PROCEDURE — 6360000002 HC RX W HCPCS: Performed by: STUDENT IN AN ORGANIZED HEALTH CARE EDUCATION/TRAINING PROGRAM

## 2024-01-15 PROCEDURE — 81001 URINALYSIS AUTO W/SCOPE: CPT

## 2024-01-15 PROCEDURE — 96374 THER/PROPH/DIAG INJ IV PUSH: CPT

## 2024-01-15 PROCEDURE — 96375 TX/PRO/DX INJ NEW DRUG ADDON: CPT

## 2024-01-15 PROCEDURE — 96376 TX/PRO/DX INJ SAME DRUG ADON: CPT

## 2024-01-15 PROCEDURE — A4216 STERILE WATER/SALINE, 10 ML: HCPCS | Performed by: EMERGENCY MEDICINE

## 2024-01-15 PROCEDURE — 36415 COLL VENOUS BLD VENIPUNCTURE: CPT

## 2024-01-15 PROCEDURE — 82248 BILIRUBIN DIRECT: CPT

## 2024-01-15 PROCEDURE — 83690 ASSAY OF LIPASE: CPT

## 2024-01-15 PROCEDURE — 85025 COMPLETE CBC W/AUTO DIFF WBC: CPT

## 2024-01-15 PROCEDURE — 84702 CHORIONIC GONADOTROPIN TEST: CPT

## 2024-01-15 PROCEDURE — 83735 ASSAY OF MAGNESIUM: CPT

## 2024-01-15 PROCEDURE — 80053 COMPREHEN METABOLIC PANEL: CPT

## 2024-01-15 RX ORDER — PROCHLORPERAZINE EDISYLATE 5 MG/ML
10 INJECTION INTRAMUSCULAR; INTRAVENOUS ONCE AS NEEDED
Status: COMPLETED | OUTPATIENT
Start: 2024-01-15 | End: 2024-01-15

## 2024-01-15 RX ORDER — MORPHINE SULFATE 4 MG/ML
4 INJECTION, SOLUTION INTRAMUSCULAR; INTRAVENOUS ONCE
Status: COMPLETED | OUTPATIENT
Start: 2024-01-16 | End: 2024-01-15

## 2024-01-15 RX ORDER — PROMETHAZINE HYDROCHLORIDE 25 MG/ML
50 INJECTION, SOLUTION INTRAMUSCULAR; INTRAVENOUS
Status: DISCONTINUED | OUTPATIENT
Start: 2024-01-15 | End: 2024-01-15

## 2024-01-15 RX ORDER — MEPERIDINE HYDROCHLORIDE 50 MG/ML
50 INJECTION INTRAMUSCULAR; INTRAVENOUS; SUBCUTANEOUS
Status: DISCONTINUED | OUTPATIENT
Start: 2024-01-15 | End: 2024-01-15

## 2024-01-15 RX ORDER — 0.9 % SODIUM CHLORIDE 0.9 %
1000 INTRAVENOUS SOLUTION INTRAVENOUS ONCE
Status: COMPLETED | OUTPATIENT
Start: 2024-01-15 | End: 2024-01-16

## 2024-01-15 RX ORDER — ONDANSETRON 2 MG/ML
4 INJECTION INTRAMUSCULAR; INTRAVENOUS ONCE
Status: COMPLETED | OUTPATIENT
Start: 2024-01-15 | End: 2024-01-15

## 2024-01-15 RX ORDER — MORPHINE SULFATE 4 MG/ML
4 INJECTION, SOLUTION INTRAMUSCULAR; INTRAVENOUS ONCE
Status: COMPLETED | OUTPATIENT
Start: 2024-01-15 | End: 2024-01-15

## 2024-01-15 RX ORDER — MEPERIDINE HYDROCHLORIDE 50 MG/ML
50 INJECTION INTRAMUSCULAR; INTRAVENOUS; SUBCUTANEOUS
Status: COMPLETED | OUTPATIENT
Start: 2024-01-15 | End: 2024-01-15

## 2024-01-15 RX ADMIN — ONDANSETRON 4 MG: 2 INJECTION INTRAMUSCULAR; INTRAVENOUS at 22:37

## 2024-01-15 RX ADMIN — MEPERIDINE HYDROCHLORIDE 50 MG: 50 INJECTION, SOLUTION INTRAMUSCULAR; INTRAVENOUS; SUBCUTANEOUS at 20:40

## 2024-01-15 RX ADMIN — SODIUM CHLORIDE 1000 ML: 9 INJECTION, SOLUTION INTRAVENOUS at 22:37

## 2024-01-15 RX ADMIN — MORPHINE SULFATE 4 MG: 4 INJECTION, SOLUTION INTRAMUSCULAR; INTRAVENOUS at 23:46

## 2024-01-15 RX ADMIN — PANTOPRAZOLE SODIUM 40 MG: 40 INJECTION, POWDER, FOR SOLUTION INTRAVENOUS at 22:37

## 2024-01-15 RX ADMIN — MORPHINE SULFATE 4 MG: 4 INJECTION, SOLUTION INTRAMUSCULAR; INTRAVENOUS at 22:37

## 2024-01-15 RX ADMIN — PROCHLORPERAZINE EDISYLATE 10 MG: 5 INJECTION INTRAMUSCULAR; INTRAVENOUS at 20:40

## 2024-01-15 ASSESSMENT — ENCOUNTER SYMPTOMS
EYE ITCHING: 0
EYE REDNESS: 0
CONSTIPATION: 0
VOICE CHANGE: 0
EYE PAIN: 0
VOMITING: 0
ABDOMINAL DISTENTION: 0
BLOOD IN STOOL: 0
SORE THROAT: 0
RHINORRHEA: 0
CHOKING: 0
DIARRHEA: 0
PHOTOPHOBIA: 0
COUGH: 0
SINUS PRESSURE: 0
NAUSEA: 0
BACK PAIN: 0
EYE DISCHARGE: 0
WHEEZING: 0
ABDOMINAL PAIN: 1
TROUBLE SWALLOWING: 0
SHORTNESS OF BREATH: 0
CHEST TIGHTNESS: 0

## 2024-01-15 ASSESSMENT — PAIN DESCRIPTION - DESCRIPTORS: DESCRIPTORS: CRAMPING

## 2024-01-15 ASSESSMENT — PAIN - FUNCTIONAL ASSESSMENT
PAIN_FUNCTIONAL_ASSESSMENT: NONE - DENIES PAIN
PAIN_FUNCTIONAL_ASSESSMENT: 0-10
PAIN_FUNCTIONAL_ASSESSMENT: ACTIVITIES ARE NOT PREVENTED

## 2024-01-15 ASSESSMENT — PAIN SCALES - GENERAL
PAINLEVEL_OUTOF10: 9
PAINLEVEL_OUTOF10: 10
PAINLEVEL_OUTOF10: 10
PAINLEVEL_OUTOF10: 8

## 2024-01-15 ASSESSMENT — PAIN DESCRIPTION - LOCATION
LOCATION: ABDOMEN

## 2024-01-16 ENCOUNTER — TELEPHONE (OUTPATIENT)
Dept: FAMILY MEDICINE CLINIC | Age: 34
End: 2024-01-16

## 2024-01-16 ENCOUNTER — TELEPHONE (OUTPATIENT)
Dept: SURGERY | Age: 34
End: 2024-01-16

## 2024-01-16 RX ORDER — DOCUSATE SODIUM 100 MG/1
100 CAPSULE, LIQUID FILLED ORAL 2 TIMES DAILY
Qty: 60 CAPSULE | Refills: 0 | Status: SHIPPED | OUTPATIENT
Start: 2024-01-16 | End: 2024-02-15

## 2024-01-16 RX ORDER — OXYCODONE HYDROCHLORIDE AND ACETAMINOPHEN 5; 325 MG/1; MG/1
1 TABLET ORAL EVERY 6 HOURS PRN
Qty: 12 TABLET | Refills: 0 | Status: SHIPPED | OUTPATIENT
Start: 2024-01-16 | End: 2024-01-19

## 2024-01-16 NOTE — FLOWSHEET NOTE
Pt arrives to 5C05 with c/o abdominal pain that started around 2 hours ago. Pt states she has constant abdominal pain and then pain that comes and goes and associates it with abdominal tightening. Pt states she was admitted last week for kidney stones work up, but states this is a different type of pain. Pt denies bleeding or leaking of fluids from the vagina. + fetal movement noted. Pt oriented to room and call light system, clean gown provided. Pt to the bathroom to void.

## 2024-01-16 NOTE — ED TRIAGE NOTES
Pt presents to the ed with c/o right upper quadrant pain. Pt states 10/10 pain. Pt is 32 weeks pregnant. Pt states that she was just seen on OB and ruled out that it wasn't pregnancy related. Pt states that the pain started around 1730 tonight. Pt states that OB gave her pain medication with no relief.

## 2024-01-16 NOTE — ED PROVIDER NOTES
[facility]  eMERGENCY dEPARTMENT eNCOUnter          CHIEF COMPLAINT       Chief Complaint   Patient presents with    Abdominal Pain       Nurses Notes reviewed and I agree except as noted in the HPI.      HISTORY OF PRESENT ILLNESS    Dedra Beaulieu is a 33 y.o. female who presents for right upper quadrant and epigastric pain.  Patient rates it 10 out of 10.  She just came down from L&D.  She sees Dr. Burr.  She is approximately 34 weeks pregnant.  Patient had abdominal pain earlier this month.  She had bilateral ultrasounds of the kidneys which were negative.  Patient states that she cannot attribute it to food.  She does not seem to see anything make it better or worse.  She was given pain medication but it did not improve.  Patient is sent down here for further evaluation.  Patient is otherwise resting comfortably on the cot no apparent distress mild to moderate amount of pain no other physical complaints at this time.  Specifically no vaginal bleeding or discharge.  NST has already been performed by OB/GYN.  Patient states she has been having bowel movements.  They have been normal for her.    REVIEW OF SYSTEMS     Review of Systems   Constitutional:  Negative for activity change, appetite change, diaphoresis, fatigue and unexpected weight change.   HENT:  Negative for congestion, ear discharge, ear pain, hearing loss, rhinorrhea, sinus pressure, sore throat, trouble swallowing and voice change.    Eyes:  Negative for photophobia, pain, discharge, redness and itching.   Respiratory:  Negative for cough, choking, chest tightness, shortness of breath and wheezing.    Cardiovascular:  Negative for chest pain, palpitations and leg swelling.   Gastrointestinal:  Positive for abdominal pain. Negative for abdominal distention, blood in stool, constipation, diarrhea, nausea and vomiting.   Endocrine: Negative for polydipsia, polyphagia and polyuria.   Genitourinary:  Negative for decreased urine

## 2024-01-16 NOTE — FLOWSHEET NOTE
Discharge instructions given and reviewed with patient.  Informed patient to notify physican or come back to L & D if leaking fluid from vagina with or without contractions.  Bright red vaginal bleeding occurs that is as heavy as or heavier than a period.  Regular contractions that are 2-5 minutes apart that are longer, stronger, and closer together.  Decreased fetal movement.  Elevated temperature >100.5°F and chills.  Blurred vision, spots before eyes, unrelievable headache, severe facial swelling, or upper abdominal pain. Questions denied. Pt taken by wheelchair to ED lobby. Mother at pt's side.

## 2024-01-16 NOTE — TELEPHONE ENCOUNTER
Left voicemail; appt with Dr. Gonzalez on 01/17 needs moved from 9:15 to 8:45 due to Carlos being in a meeting

## 2024-01-16 NOTE — DISCHARGE INSTRUCTIONS
Patient has what appears to be biliary colic.  Patient has been given pain medication and Colace she is instructed to take it as prescribed.  Patient is instructed to keep her scheduled follow-up appointment with her OB/GYN.  Patient has also been given the name of a general surgeon she is instructed to follow-up with them as well.  Patient is instructed return to the nearest emergency room immediately for any new or worsening complaints.

## 2024-01-16 NOTE — H&P
[facility]  History and Physicial      Patient:  Dedra Beaulieu  YOB: 1990  MRN: [id number]     Acct: [id number]    HISTORY OF PRESENT ILLNESS:     Dedra Beaulieu is a 33 y.o. female  @32w3d presents to L&D with complaint of right sided abdominal pain. Pt was here last week with left flank pain, worked up for kidney stone, which was negative and ultimately felt to be muscle spasm, as pain did improve with Flexeril.   Tonight pt says her pain started around 5 or 6pm and has been constant. She points to her epigastric area when asked to locate the pain. She says the pain does radiate around to her back and down the right side of her abdomen. She rated it 10/10 when she got here. She did get a dose of Demerol 50mg and now rates it 8/10. Had some N/V shortly after she got here. It does not sound like labor, cervix is closed on exam and she is not having contractions on the monitor. Denies any vaginal bleeding or LOF. Reports good FM.    Obstetric History: # 1 - Date: None, Sex: Female, Weight: None, GA: None, Delivery: None, Apgar1: None, Apgar5: None, Living: None, Birth Comments: None    # 2 - Date: None, Sex: None, Weight: None, GA: None, Delivery: Spontaneous , Apgar1: None, Apgar5: None, Living: None, Birth Comments: None    # 3 - Date: None, Sex: None, Weight: None, GA: None, Delivery: None, Apgar1: None, Apgar5: None, Living: None, Birth Comments: None      Past Medical History:        Diagnosis Date    Abnormal Pap smear of cervix     2023    Gestational HTN     Hypertension        Past Surgical History:        Procedure Laterality Date    DILATION AND CURETTAGE OF UTERUS N/A 2023    DILATATION AND CURETTAGE SUCTION performed by Sharri Sol MD at [facility] OR       Medications: Scheduled Meds:  Continuous Infusions:  PRN Meds:.    Allergies:  Patient has no known allergies.    Social History:    reports that she has never smoked. She has never used smokeless

## 2024-01-16 NOTE — FLOWSHEET NOTE
Dr. Pena notified of admission. Admission note reviewed. FHT reactive, occasional contraction noted. Abdomen soft. Pt states pain is noted on right side of abdomen. Orders received.

## 2024-01-16 NOTE — FLOWSHEET NOTE
Dr. Pena at bedside. Pt agreeable to plan of care and would like to be seen in ED for abd pain. Pt removed from monitors at this time.

## 2024-01-16 NOTE — ED NOTES
Patient rates pain at an 8/10 at this time. Patient resting in bed. Respirations easy and unlabored. No distress noted. Call light within reach.

## 2024-01-17 ENCOUNTER — OFFICE VISIT (OUTPATIENT)
Dept: SURGERY | Age: 34
End: 2024-01-17
Payer: COMMERCIAL

## 2024-01-17 ENCOUNTER — APPOINTMENT (OUTPATIENT)
Dept: GENERAL RADIOLOGY | Age: 34
End: 2024-01-17
Payer: COMMERCIAL

## 2024-01-17 ENCOUNTER — APPOINTMENT (OUTPATIENT)
Dept: ULTRASOUND IMAGING | Age: 34
End: 2024-01-17
Payer: COMMERCIAL

## 2024-01-17 ENCOUNTER — TELEPHONE (OUTPATIENT)
Dept: SURGERY | Age: 34
End: 2024-01-17

## 2024-01-17 ENCOUNTER — HOSPITAL ENCOUNTER (OUTPATIENT)
Dept: ULTRASOUND IMAGING | Age: 34
Discharge: HOME | End: 2024-01-17
Attending: SURGERY
Payer: COMMERCIAL

## 2024-01-17 ENCOUNTER — HOSPITAL ENCOUNTER (OUTPATIENT)
Age: 34
Discharge: HOME | End: 2024-01-18
Attending: STUDENT IN AN ORGANIZED HEALTH CARE EDUCATION/TRAINING PROGRAM | Admitting: STUDENT IN AN ORGANIZED HEALTH CARE EDUCATION/TRAINING PROGRAM
Payer: COMMERCIAL

## 2024-01-17 VITALS
TEMPERATURE: 97.3 F | BODY MASS INDEX: 40.65 KG/M2 | RESPIRATION RATE: 18 BRPM | WEIGHT: 244 LBS | SYSTOLIC BLOOD PRESSURE: 126 MMHG | DIASTOLIC BLOOD PRESSURE: 76 MMHG | HEART RATE: 105 BPM | HEIGHT: 65 IN | OXYGEN SATURATION: 98 %

## 2024-01-17 DIAGNOSIS — K82.8 SLUDGE IN GALLBLADDER: Primary | ICD-10-CM

## 2024-01-17 DIAGNOSIS — K82.8 SLUDGE IN GALLBLADDER: ICD-10-CM

## 2024-01-17 DIAGNOSIS — Z34.90 INTRAUTERINE PREGNANCY: ICD-10-CM

## 2024-01-17 DIAGNOSIS — R10.11 RIGHT UPPER QUADRANT PAIN: ICD-10-CM

## 2024-01-17 LAB
ALBUMIN SERPL BCG-MCNC: 3.8 G/DL (ref 3.5–5.1)
ALP SERPL-CCNC: 111 U/L (ref 38–126)
ALT SERPL W/O P-5'-P-CCNC: 6 U/L (ref 11–66)
ANION GAP SERPL CALC-SCNC: 18 MEQ/L (ref 8–16)
AST SERPL-CCNC: 14 U/L (ref 5–40)
BILIRUB SERPL-MCNC: 2.7 MG/DL (ref 0.3–1.2)
BILIRUB UR QL STRIP.AUTO: NEGATIVE
BUN SERPL-MCNC: 4 MG/DL (ref 7–22)
CALCIUM SERPL-MCNC: 9.8 MG/DL (ref 8.5–10.5)
CHARACTER UR: CLEAR
CHLORIDE SERPL-SCNC: 99 MEQ/L (ref 98–111)
CO2 SERPL-SCNC: 17 MEQ/L (ref 23–33)
COLOR: YELLOW
CREAT SERPL-MCNC: 0.4 MG/DL (ref 0.4–1.2)
CREAT UR-MCNC: 185.1 MG/DL
DEPRECATED RDW RBC AUTO: 47.5 FL (ref 35–45)
ERYTHROCYTE [DISTWIDTH] IN BLOOD BY AUTOMATED COUNT: 14.6 % (ref 11.5–14.5)
GFR SERPL CREATININE-BSD FRML MDRD: > 60 ML/MIN/1.73M2
GLUCOSE SERPL-MCNC: 80 MG/DL (ref 70–108)
GLUCOSE UR QL STRIP.AUTO: NEGATIVE MG/DL
HCT VFR BLD AUTO: 36.2 % (ref 37–47)
HGB BLD-MCNC: 12 GM/DL (ref 12–16)
HGB UR QL STRIP.AUTO: NEGATIVE
KETONES UR QL STRIP.AUTO: 80
MCH RBC QN AUTO: 29.7 PG (ref 26–33)
MCHC RBC AUTO-ENTMCNC: 33.1 GM/DL (ref 32.2–35.5)
MCV RBC AUTO: 89.6 FL (ref 81–99)
NITRITE UR QL STRIP: NEGATIVE
PH UR STRIP.AUTO: 5.5 [PH] (ref 5–9)
PLATELET # BLD AUTO: 326 THOU/MM3 (ref 130–400)
PMV BLD AUTO: 9 FL (ref 9.4–12.4)
POTASSIUM SERPL-SCNC: 3.5 MEQ/L (ref 3.5–5.2)
PROT SERPL-MCNC: 7.5 G/DL (ref 6.1–8)
PROT UR STRIP.AUTO-MCNC: NEGATIVE MG/DL
PROT UR-MCNC: 77.5 MG/DL
PROT/CREAT 24H UR: 0.42 MG/G{CREAT}
RBC # BLD AUTO: 4.04 MILL/MM3 (ref 4.2–5.4)
SODIUM SERPL-SCNC: 134 MEQ/L (ref 135–145)
SP GR UR REFRACT.AUTO: 1.01 (ref 1–1.03)
UROBILINOGEN, URINE: 0.2 EU/DL (ref 0–1)
WBC # BLD AUTO: 17.3 THOU/MM3 (ref 4.8–10.8)
WBC #/AREA URNS HPF: NEGATIVE /[HPF]

## 2024-01-17 PROCEDURE — G8427 DOCREV CUR MEDS BY ELIG CLIN: HCPCS | Performed by: SURGERY

## 2024-01-17 PROCEDURE — 84156 ASSAY OF PROTEIN URINE: CPT

## 2024-01-17 PROCEDURE — 2580000003 HC RX 258: Performed by: OBSTETRICS & GYNECOLOGY

## 2024-01-17 PROCEDURE — 76770 US EXAM ABDO BACK WALL COMP: CPT

## 2024-01-17 PROCEDURE — 80053 COMPREHEN METABOLIC PANEL: CPT

## 2024-01-17 PROCEDURE — 2580000003 HC RX 258: Performed by: STUDENT IN AN ORGANIZED HEALTH CARE EDUCATION/TRAINING PROGRAM

## 2024-01-17 PROCEDURE — 1036F TOBACCO NON-USER: CPT | Performed by: SURGERY

## 2024-01-17 PROCEDURE — 6360000002 HC RX W HCPCS: Performed by: OBSTETRICS & GYNECOLOGY

## 2024-01-17 PROCEDURE — 85027 COMPLETE CBC AUTOMATED: CPT

## 2024-01-17 PROCEDURE — G8419 CALC BMI OUT NRM PARAM NOF/U: HCPCS | Performed by: SURGERY

## 2024-01-17 PROCEDURE — 76705 ECHO EXAM OF ABDOMEN: CPT

## 2024-01-17 PROCEDURE — 36415 COLL VENOUS BLD VENIPUNCTURE: CPT

## 2024-01-17 PROCEDURE — 82570 ASSAY OF URINE CREATININE: CPT

## 2024-01-17 PROCEDURE — 71046 X-RAY EXAM CHEST 2 VIEWS: CPT

## 2024-01-17 PROCEDURE — 99204 OFFICE O/P NEW MOD 45 MIN: CPT | Performed by: SURGERY

## 2024-01-17 PROCEDURE — 81003 URINALYSIS AUTO W/O SCOPE: CPT

## 2024-01-17 PROCEDURE — 6370000000 HC RX 637 (ALT 250 FOR IP): Performed by: OBSTETRICS & GYNECOLOGY

## 2024-01-17 PROCEDURE — G8484 FLU IMMUNIZE NO ADMIN: HCPCS | Performed by: SURGERY

## 2024-01-17 PROCEDURE — 6360000002 HC RX W HCPCS: Performed by: STUDENT IN AN ORGANIZED HEALTH CARE EDUCATION/TRAINING PROGRAM

## 2024-01-17 RX ORDER — PANTOPRAZOLE SODIUM 20 MG/1
20 TABLET, DELAYED RELEASE ORAL DAILY
COMMUNITY

## 2024-01-17 RX ORDER — OXYCODONE HYDROCHLORIDE 5 MG/1
5 TABLET ORAL EVERY 4 HOURS PRN
Status: DISCONTINUED | OUTPATIENT
Start: 2024-01-17 | End: 2024-01-18 | Stop reason: HOSPADM

## 2024-01-17 RX ORDER — SODIUM CHLORIDE 9 MG/ML
INJECTION, SOLUTION INTRAVENOUS CONTINUOUS
Status: DISCONTINUED | OUTPATIENT
Start: 2024-01-17 | End: 2024-01-18 | Stop reason: HOSPADM

## 2024-01-17 RX ORDER — HYDROXYZINE HYDROCHLORIDE 50 MG/ML
50 INJECTION, SOLUTION INTRAMUSCULAR
Status: COMPLETED | OUTPATIENT
Start: 2024-01-17 | End: 2024-01-17

## 2024-01-17 RX ORDER — SODIUM CHLORIDE, SODIUM LACTATE, POTASSIUM CHLORIDE, CALCIUM CHLORIDE 600; 310; 30; 20 MG/100ML; MG/100ML; MG/100ML; MG/100ML
INJECTION, SOLUTION INTRAVENOUS ONCE
Status: COMPLETED | OUTPATIENT
Start: 2024-01-17 | End: 2024-01-17

## 2024-01-17 RX ORDER — SODIUM CHLORIDE, SODIUM LACTATE, POTASSIUM CHLORIDE, AND CALCIUM CHLORIDE .6; .31; .03; .02 G/100ML; G/100ML; G/100ML; G/100ML
125 INJECTION, SOLUTION INTRAVENOUS CONTINUOUS
Status: DISCONTINUED | OUTPATIENT
Start: 2024-01-17 | End: 2024-01-18

## 2024-01-17 RX ORDER — CYCLOBENZAPRINE HCL 10 MG
10 TABLET ORAL 3 TIMES DAILY PRN
Status: DISCONTINUED | OUTPATIENT
Start: 2024-01-17 | End: 2024-01-18 | Stop reason: HOSPADM

## 2024-01-17 RX ORDER — ONDANSETRON 2 MG/ML
8 INJECTION INTRAMUSCULAR; INTRAVENOUS EVERY 6 HOURS PRN
Status: DISCONTINUED | OUTPATIENT
Start: 2024-01-17 | End: 2024-01-18 | Stop reason: HOSPADM

## 2024-01-17 RX ORDER — DEXTROSE, SODIUM CHLORIDE, SODIUM LACTATE, POTASSIUM CHLORIDE, AND CALCIUM CHLORIDE 5; .6; .31; .03; .02 G/100ML; G/100ML; G/100ML; G/100ML; G/100ML
INJECTION, SOLUTION INTRAVENOUS CONTINUOUS
Status: DISCONTINUED | OUTPATIENT
Start: 2024-01-17 | End: 2024-01-18 | Stop reason: HOSPADM

## 2024-01-17 RX ADMIN — OXYCODONE 5 MG: 5 TABLET ORAL at 20:49

## 2024-01-17 RX ADMIN — CEFTRIAXONE SODIUM 1000 MG: 1 INJECTION, POWDER, FOR SOLUTION INTRAMUSCULAR; INTRAVENOUS at 21:26

## 2024-01-17 RX ADMIN — HYDROXYZINE HYDROCHLORIDE 50 MG: 50 INJECTION, SOLUTION INTRAMUSCULAR at 17:28

## 2024-01-17 RX ADMIN — SODIUM CHLORIDE, POTASSIUM CHLORIDE, SODIUM LACTATE AND CALCIUM CHLORIDE 1000 ML: 600; 310; 30; 20 INJECTION, SOLUTION INTRAVENOUS at 19:17

## 2024-01-17 RX ADMIN — SODIUM CHLORIDE, POTASSIUM CHLORIDE, SODIUM LACTATE AND CALCIUM CHLORIDE: 600; 310; 30; 20 INJECTION, SOLUTION INTRAVENOUS at 18:09

## 2024-01-17 RX ADMIN — CEFTRIAXONE SODIUM 1000 MG: 1 INJECTION, POWDER, FOR SOLUTION INTRAMUSCULAR; INTRAVENOUS at 22:35

## 2024-01-17 RX ADMIN — SODIUM CHLORIDE: 9 INJECTION, SOLUTION INTRAVENOUS at 22:27

## 2024-01-17 RX ADMIN — SODIUM CHLORIDE, SODIUM LACTATE, POTASSIUM CHLORIDE, CALCIUM CHLORIDE AND DEXTROSE MONOHYDRATE: 5; 600; 310; 30; 20 INJECTION, SOLUTION INTRAVENOUS at 17:15

## 2024-01-17 RX ADMIN — CYCLOBENZAPRINE 10 MG: 10 TABLET, FILM COATED ORAL at 22:37

## 2024-01-17 RX ADMIN — ONDANSETRON 8 MG: 2 INJECTION INTRAMUSCULAR; INTRAVENOUS at 20:50

## 2024-01-17 ASSESSMENT — ENCOUNTER SYMPTOMS
SINUS PRESSURE: 0
SINUS PAIN: 0
RHINORRHEA: 0
CONSTIPATION: 1
TROUBLE SWALLOWING: 0
EYE DISCHARGE: 0
EYE ITCHING: 0
NAUSEA: 1
WHEEZING: 0
FACIAL SWELLING: 0
ABDOMINAL PAIN: 1
RECTAL PAIN: 0
SORE THROAT: 0
BLOOD IN STOOL: 0
ANAL BLEEDING: 0
BACK PAIN: 1
COLOR CHANGE: 0
DIARRHEA: 0
VOMITING: 1
CHEST TIGHTNESS: 0
ABDOMINAL DISTENTION: 1
EYE PAIN: 0
APNEA: 0
EYE REDNESS: 0
COUGH: 0
PHOTOPHOBIA: 0
SHORTNESS OF BREATH: 0
CHOKING: 0
STRIDOR: 0
VOICE CHANGE: 0

## 2024-01-17 ASSESSMENT — PAIN DESCRIPTION - LOCATION: LOCATION: ABDOMEN

## 2024-01-17 ASSESSMENT — PAIN SCALES - GENERAL: PAINLEVEL_OUTOF10: 9

## 2024-01-17 ASSESSMENT — PAIN DESCRIPTION - ORIENTATION: ORIENTATION: RIGHT

## 2024-01-17 NOTE — PLAN OF CARE
Problem: Pain  Goal: Verbalizes/displays adequate comfort level or baseline comfort level  Outcome: Progressing     Problem: Infection - Adult  Goal: Absence of infection during hospitalization  Outcome: Progressing  Flowsheets (Taken 1/17/2024 1802)  Absence of infection during hospitalization:   Assess and monitor for signs and symptoms of infection   Instruct and encourage patient and family to use good hand hygiene technique     Problem: Safety - Adult  Goal: Free from fall injury  Outcome: Progressing  Flowsheets (Taken 1/17/2024 1802)  Free From Fall Injury:   Instruct family/caregiver on patient safety   Based on caregiver fall risk screen, instruct family/caregiver to ask for assistance with transferring infant if caregiver noted to have fall risk factors     Problem: Discharge Planning  Goal: Discharge to home or other facility with appropriate resources  Outcome: Progressing  Flowsheets (Taken 1/17/2024 1802)  Discharge to home or other facility with appropriate resources: Refer to discharge planning if patient needs post-hospital services based on physician order or complex needs related to functional status, cognitive ability or social support system   Care plan reviewed with patient and Nabil.  Patient and Nabil verbalize understanding of the plan of care and contribute to goal setting.

## 2024-01-17 NOTE — FLOWSHEET NOTE
Pt presents to 5c04 from Dr Pena office for further evaluation of pain in there right upper quad. Pt c/o of  a sharp stabbing, shooting pain in her right upper quad pain that started on Monday. Pt states she has been vomiting since then with last time she vomited last evening. Pt states she was here on Monday for evaluation of the pain and sent to the ED for further evaluation since it was felt to be not pregnancy related. Pt was seen by a general surgeon today for follow up on possible gallbladder related issues. Pt states US was done today of her gallbladder, which was negative and surgeon couldn't do further testing at this time since she was pregnant. Pt denies any vaginal bleeding or leaking of fluids. Fetal movement noted per pt. Pt oriented to room and call system. Patient to bathroom to void, informed of maternal drug testing policy in place on all laboring patients. Verbal consent received, paper consent to be signed and urine to be sent when pt stays to deliver. Dr Pena called orders prior to pts arrival.

## 2024-01-17 NOTE — TELEPHONE ENCOUNTER
Contacted the patient regarding patient's gallbladder ultrasound.  Per Dr. Gonzalez gallbladder ultrasound is unchanged and shows no signes of acute cholecystitis.  Not convinced her pain she is having all the time is is from sludge only and no acute findings.  Patient was instructed to keep her appointment with her OB/GYN today.  Patient voiced understanding.

## 2024-01-17 NOTE — PROGRESS NOTES
Subjective:      Patient ID: Dedra Beaulieu is a 33 y.o. female.    HPI    Review of Systems   Constitutional:  Positive for activity change. Negative for appetite change, diaphoresis, fatigue, fever and unexpected weight change.   HENT:  Negative for congestion, dental problem, drooling, ear discharge, ear pain, facial swelling, hearing loss, mouth sores, nosebleeds, postnasal drip, rhinorrhea, sinus pressure, sinus pain, sneezing, sore throat, tinnitus, trouble swallowing and voice change.    Eyes:  Negative for photophobia, pain, discharge, redness, itching and visual disturbance.   Respiratory:  Negative for apnea, cough, choking, chest tightness, shortness of breath, wheezing and stridor.    Cardiovascular:  Positive for chest pain. Negative for palpitations and leg swelling.   Gastrointestinal:  Positive for abdominal distention, abdominal pain, constipation, nausea and vomiting. Negative for anal bleeding, blood in stool, diarrhea and rectal pain.   Endocrine: Positive for cold intolerance and heat intolerance. Negative for polydipsia, polyphagia and polyuria.   Genitourinary:  Negative for decreased urine volume, difficulty urinating, dyspareunia, dysuria, enuresis, flank pain, frequency, genital sores, hematuria, menstrual problem, pelvic pain, urgency, vaginal bleeding, vaginal discharge and vaginal pain.   Musculoskeletal:  Positive for back pain. Negative for arthralgias, gait problem, joint swelling, myalgias, neck pain and neck stiffness.   Skin:  Negative for color change, pallor, rash and wound.   Allergic/Immunologic: Negative for environmental allergies, food allergies and immunocompromised state.   Neurological:  Negative for dizziness, tremors, seizures, syncope, facial asymmetry, speech difficulty, weakness, light-headedness, numbness and headaches.   Hematological:  Negative for adenopathy. Does not bruise/bleed easily.   Psychiatric/Behavioral:  Negative for agitation, behavioral problems, 
II-XII are grossly intact..   EXTREMITIES: no cyanosis, no clubbing, and no edema.            Electronically signed by Amarjit Gonzalez MD on 1/17/2024 at 9:12 AM

## 2024-01-17 NOTE — FLOWSHEET NOTE
RN to the room to adjust monitors, pt is on her far side crying and moaning. Pt states she cannot do this anymore, pt c/o of right upper abd pain.

## 2024-01-17 NOTE — FLOWSHEET NOTE
Patient of Dr. Pena arrived to labor and delivery unit via wheelchair, , 32+5. Patient denies vaginal bleeding, abnormal discharge, and contractions. Patient reports feeling good fetal movement at this time. Patient c/o R sided abdominal pain describing it as sharp/stabbing pain. States that she was on labor and delivery unit on Monday and was discharged due to problems not related to pregnancy and was told to go to the ER for further evaluation, of which she was discharged from the ER on Monday night with a consult to surgery for an ultrasound of her gallbladder. Patient reports that she vomited last night, an ultrasound was performed this date for further evaluation of her gallbladder, of which resulted negative. She also attended her OB appointment this date and was sent over from the office for further evaluation of her R sided pain and blood pressures. Patient states that she has not been drinking a lot of fluids this date either, therefore water provided to patient and educated for good oral intake. Fetal ultrasound and uterine tocometer applied to soft, non tender region of abdomen. Will monitor fetal heart tones and notify physician.

## 2024-01-17 NOTE — FLOWSHEET NOTE
Ultrasound transducer adjusted in the LLQ and fetal heart tones audible at 152 and resting in bed with slight right tilt.

## 2024-01-17 NOTE — FLOWSHEET NOTE
Dr Houser called the unit with updated orders, orders to keep pt on the monitor, Md viewed admission bp. IV already running, labs sent, Vistaril given, still need to send urine, orders to call Dr Acevedo with labs, if abnormal will consider transfer out. Will update primary RN Dhara Degroot RN.

## 2024-01-18 ENCOUNTER — TELEPHONE (OUTPATIENT)
Dept: FAMILY MEDICINE CLINIC | Age: 34
End: 2024-01-18

## 2024-01-18 VITALS
OXYGEN SATURATION: 97 % | HEIGHT: 65 IN | RESPIRATION RATE: 16 BRPM | BODY MASS INDEX: 39.99 KG/M2 | TEMPERATURE: 97.3 F | DIASTOLIC BLOOD PRESSURE: 58 MMHG | SYSTOLIC BLOOD PRESSURE: 126 MMHG | HEART RATE: 94 BPM | WEIGHT: 240 LBS

## 2024-01-18 PROCEDURE — 2580000003 HC RX 258: Performed by: STUDENT IN AN ORGANIZED HEALTH CARE EDUCATION/TRAINING PROGRAM

## 2024-01-18 PROCEDURE — 6370000000 HC RX 637 (ALT 250 FOR IP): Performed by: OBSTETRICS & GYNECOLOGY

## 2024-01-18 RX ORDER — SODIUM CHLORIDE, SODIUM LACTATE, POTASSIUM CHLORIDE, CALCIUM CHLORIDE 600; 310; 30; 20 MG/100ML; MG/100ML; MG/100ML; MG/100ML
INJECTION, SOLUTION INTRAVENOUS CONTINUOUS
Status: DISCONTINUED | OUTPATIENT
Start: 2024-01-17 | End: 2024-01-18 | Stop reason: HOSPADM

## 2024-01-18 RX ORDER — CYCLOBENZAPRINE HCL 10 MG
10 TABLET ORAL 3 TIMES DAILY PRN
Qty: 90 TABLET | Refills: 1 | Status: SHIPPED | OUTPATIENT
Start: 2024-01-18

## 2024-01-18 RX ADMIN — CYCLOBENZAPRINE 10 MG: 10 TABLET, FILM COATED ORAL at 06:38

## 2024-01-18 RX ADMIN — SODIUM CHLORIDE, POTASSIUM CHLORIDE, SODIUM LACTATE AND CALCIUM CHLORIDE 1000 ML: 600; 310; 30; 20 INJECTION, SOLUTION INTRAVENOUS at 06:53

## 2024-01-18 ASSESSMENT — PAIN DESCRIPTION - ORIENTATION: ORIENTATION: RIGHT

## 2024-01-18 ASSESSMENT — PAIN DESCRIPTION - DESCRIPTORS: DESCRIPTORS: STABBING

## 2024-01-18 ASSESSMENT — PAIN DESCRIPTION - LOCATION: LOCATION: ABDOMEN

## 2024-01-18 ASSESSMENT — PAIN SCALES - GENERAL: PAINLEVEL_OUTOF10: 9

## 2024-01-18 NOTE — FLOWSHEET NOTE
Discharge instructions were given by dr silva when she was in to see pt, reinforced instructions, pt with no questions, ambulated to main entrance for discharge home

## 2024-01-18 NOTE — PLAN OF CARE
Problem: Discharge Planning  Goal: Discharge to home or other facility with appropriate resources  1/18/2024 0725 by Maria T Romero, RN  Outcome: Progressing   Care plan reviewed with patient.  Patient verbalizes understanding of the plan of care and contribute to goal setting.

## 2024-01-18 NOTE — FLOWSHEET NOTE
Dr Matos messaged with update on xray results. Doctor calls, states patient may be off monitor overnight to promote rest as FHT are reactive.

## 2024-01-18 NOTE — FLOWSHEET NOTE
Bedside report given to Sunita Romero RN. Patient up to void and left in stable condition, after being informed that Dr Pena will be at bedside this morning to discuss POC with her.

## 2024-01-18 NOTE — FLOWSHEET NOTE
Dr Collado calls unit to review patient labs, pain level, vital signs, and FHTs. Orders received for further labs, antibiotic, and pain and nausea treatment, doctor instructed RN to message with update in an hour. Doctor states to ask patient regarding her bowel movements and regularity d/t concern pain may be caused by constipation and gas.

## 2024-01-18 NOTE — FLOWSHEET NOTE
Dr Matos calls to ok running rocephin with 0.9 normal saline instead of LR. Doctor also reviews patient's pain level, states we may offer a dose of flexeril if patient would like

## 2024-01-18 NOTE — FLOWSHEET NOTE
Patient awake after sleeping from approximately 0030 to 0630. Complains of 9/10 pain in RUQ. Patient to bathroom to void, monitors reapplied afterwards, and patient given flexeril as prescribed.

## 2024-01-18 NOTE — PLAN OF CARE
Problem: Pain  Goal: Verbalizes/displays adequate comfort level or baseline comfort level  1/17/2024 1925 by Brandi Dupont RN  Outcome: Progressing     Problem: Infection - Adult  Goal: Absence of infection during hospitalization  1/17/2024 1925 by Brandi Dupont RN  Outcome: Progressing     Problem: Safety - Adult  Goal: Free from fall injury  1/17/2024 1925 by Brandi Dupont RN  Outcome: Progressing     Problem: Discharge Planning  Goal: Discharge to home or other facility with appropriate resources  1/17/2024 1925 by Brandi Dupont RN  Outcome: Progressing  Care plan reviewed with patient and family.  Patient and family verbalize understanding of the plan of care and contribute to goal setting.

## 2024-01-18 NOTE — FLOWSHEET NOTE
Bedside report received from Dhara Degroot RN. Patient's vital signs measured, patient in stable condition. Patient able to void, urine sample sent for protein creatine ratio.

## 2024-01-18 NOTE — FLOWSHEET NOTE
Dr Pena calls, reviews patient status, labs, and imaging, and FHT, and states she will be in later this morning to discuss POC with patient. States that RN should obtain an NST this morning.

## 2024-01-18 NOTE — H&P
[facility]  History and Physicial      Patient:  Dedra Beaulieu  YOB: 1990  MRN: [id number]     Acct: [id number]    HISTORY OF PRESENT ILLNESS:     Dedra Beaulieu is a 33 y.o. female  @32w6d presented to L&D from the office for epigastric pain, nausea & vomiting. Pt says her pain wraps around to the right side of her back and hurts when she moves certain ways. She has not been able to eat or drink much the last few days and is very dehydrated. She has not had a BM in 3-4 days. Last week she was sent home with Flexeril, but has only taken two doses at home since that time because she does not like to take medications in pregnancy.  Pt was here last week and this week Monday. Last week she had MRI abdomen and pelvis which was normal. This Monday she had RUQ US which showed just some gallbladder sludge. She saw general surgery outpatient yesterday and they do not believe the pain is gallbladder related.   This admission she had 1 elevated BP on arrival, with an inappropriate sized BP cuff. Since cuff was changed, all other BP have been wnl. Mildly elevated WBC count and received a dose of Rocephin last night thinking it may be an abnormal presentation of pyelo. Her UA and renal US last night were normal again last night. She received Flexeril last night and was able to get relief and rest. Discussed with patient this morning that with normal testing and based on symptoms, most likely diagnosis at this point is muscular spasms.   This morning pt says her pain is tolerable. Reports good FM. Denies VB, LOF, CTX.    Obstetric History: # 1 - Date: None, Sex: None, Weight: None, GA: None, Delivery: Spontaneous , Apgar1: None, Apgar5: None, Living: None, Birth Comments: None    # 2 - Date: 14, Sex: Female, Weight: 2.608 kg (5 lb 12 oz), GA: None, Delivery: Vaginal, Spontaneous, Apgar1: None, Apgar5: None, Living: Living, Birth Comments: None    # 3 - Date: None, Sex: None,

## 2024-01-18 NOTE — FLOWSHEET NOTE
Dr Matos calls , reviews lab results and US as well as patient's current pain level and c/o pain with breathing. States to keep patient overnight, continue with flexeril q8 hrs PRN, and to order a chest xray to rule out rib fracture. States to continue with maintenance fluids at 125/hr and to encourage patient to hydrate PO.

## 2024-01-18 NOTE — FLOWSHEET NOTE
POC discussed with patient and pain and nausea medication administered per doctor's orders. Discussed ordered labs and tests, patient consents to renal US but declines a straight catheter for UA, states her pain level is too high to tolerate the procedure even after medication. Will attempt a clean catch UA if patient continues to decline after sufficient amount of time for medication to have alleviated symptoms. Discussed patient's bowel habits and she does admit to constipation, states her last bowel movement was 3 days ago.

## 2024-01-18 NOTE — FLOWSHEET NOTE
Patient in radiology and then to restroom to void between 2338 and 2348. Patient transported via wheelchair by EVELYN Dupont RN and HIEN Castañeda RN.

## 2024-01-18 NOTE — FLOWSHEET NOTE
Dr Matos calls to discuss patient's status. Confirms to administer flexeril, no change to POC. Will message with update when US results are visible.

## 2024-01-18 NOTE — FLOWSHEET NOTE
Patient to restroom to provide clean catch urine sample, supplies and education provided per RN. Patient states she thinks she may need to have a bowel movement now. Patient agreeable to try flexeril for pain when she comes back to bed from restroom.

## 2024-01-19 ENCOUNTER — TELEPHONE (OUTPATIENT)
Dept: FAMILY MEDICINE CLINIC | Age: 34
End: 2024-01-19

## 2024-01-19 NOTE — TELEPHONE ENCOUNTER
Called pt, was inform this needs to be done and to call to schedule, stated she had number on her paperwork already.  Has been in and out of the hospital for the last week so she hasn't had time

## 2024-01-19 NOTE — TELEPHONE ENCOUNTER
Please call patient to see if she has gotten call to schedule soft tissue ultrasound of mass on back. If not please provide phone number for central scheduling and advise her to schedule ASAP. Thanks.    Electronically signed by Kati He DO on 1/18/2024 at 10:48 PM

## 2024-01-22 NOTE — TELEPHONE ENCOUNTER
Called and spoke with patient. Patient states that she would like to go ahead and get this scheduled. Patient transferred to scheduling so they could further assist.

## 2024-01-30 ENCOUNTER — HOSPITAL ENCOUNTER (OUTPATIENT)
Dept: ULTRASOUND IMAGING | Age: 34
Discharge: HOME | End: 2024-01-30
Payer: COMMERCIAL

## 2024-01-30 DIAGNOSIS — L72.3 SEBACEOUS CYST: ICD-10-CM

## 2024-01-30 PROCEDURE — 76604 US EXAM CHEST: CPT

## 2024-01-31 ENCOUNTER — TELEPHONE (OUTPATIENT)
Dept: FAMILY MEDICINE CLINIC | Age: 34
End: 2024-01-31

## 2024-01-31 NOTE — RESULT ENCOUNTER NOTE
Please notify patient, ultrsound most consistent with benign fibroid. We can readdress it after delivery unless it continues to grow or is very painful, have her notify us sooner. Thanks!    Electronically signed by Kati He DO on 1/31/2024 at 1:32 PM

## 2024-01-31 NOTE — TELEPHONE ENCOUNTER
----- Message from Kati He DO sent at 1/31/2024  1:32 PM EST -----  Please notify patient, ultrsound most consistent with benign fibroid. We can readdress it after delivery unless it continues to grow or is very painful, have her notify us sooner. Thanks!    Electronically signed by Kati He DO on 1/31/2024 at 1:32 PM

## 2024-02-21 ENCOUNTER — ANESTHESIA EVENT (OUTPATIENT)
Dept: LABOR AND DELIVERY | Age: 34
End: 2024-02-21
Payer: COMMERCIAL

## 2024-02-21 ENCOUNTER — HOSPITAL ENCOUNTER (INPATIENT)
Age: 34
LOS: 1 days | Discharge: HOME | End: 2024-02-22
Attending: STUDENT IN AN ORGANIZED HEALTH CARE EDUCATION/TRAINING PROGRAM | Admitting: STUDENT IN AN ORGANIZED HEALTH CARE EDUCATION/TRAINING PROGRAM
Payer: COMMERCIAL

## 2024-02-21 ENCOUNTER — ANESTHESIA (OUTPATIENT)
Dept: LABOR AND DELIVERY | Age: 34
End: 2024-02-21
Payer: COMMERCIAL

## 2024-02-21 PROBLEM — Z37.9 NORMAL LABOR: Status: ACTIVE | Noted: 2024-02-21

## 2024-02-21 LAB
ABO: NORMAL
AMPHETAMINES UR QL SCN: NEGATIVE
ANTIBODY SCREEN: NORMAL
BARBITURATES UR QL SCN: NEGATIVE
BASOPHILS ABSOLUTE: 0 THOU/MM3 (ref 0–0.1)
BASOPHILS NFR BLD AUTO: 0.3 %
BENZODIAZ UR QL SCN: NEGATIVE
BZE UR QL SCN: NEGATIVE
CANNABINOIDS UR QL SCN: NEGATIVE
DEPRECATED RDW RBC AUTO: 50 FL (ref 35–45)
EOSINOPHIL NFR BLD AUTO: 1 %
EOSINOPHILS ABSOLUTE: 0.1 THOU/MM3 (ref 0–0.4)
ERYTHROCYTE [DISTWIDTH] IN BLOOD BY AUTOMATED COUNT: 15.3 % (ref 11.5–14.5)
FENTANYL: NEGATIVE
HCT VFR BLD AUTO: 36.7 % (ref 37–47)
HGB BLD-MCNC: 12.3 GM/DL (ref 12–16)
IMM GRANULOCYTES # BLD AUTO: 0.08 THOU/MM3 (ref 0–0.07)
IMM GRANULOCYTES NFR BLD AUTO: 0.7 %
LYMPHOCYTES ABSOLUTE: 2.1 THOU/MM3 (ref 1–4.8)
LYMPHOCYTES NFR BLD AUTO: 18.4 %
MCH RBC QN AUTO: 30 PG (ref 26–33)
MCHC RBC AUTO-ENTMCNC: 33.5 GM/DL (ref 32.2–35.5)
MCV RBC AUTO: 89.5 FL (ref 81–99)
MONOCYTES ABSOLUTE: 0.6 THOU/MM3 (ref 0.4–1.3)
MONOCYTES NFR BLD AUTO: 5.2 %
NEUTROPHILS NFR BLD AUTO: 74.4 %
NRBC BLD AUTO-RTO: 0 /100 WBC
OPIATES UR QL SCN: NEGATIVE
OXYCODONE: NEGATIVE
PCP UR QL SCN: NEGATIVE
PLATELET # BLD AUTO: 277 THOU/MM3 (ref 130–400)
PMV BLD AUTO: 9.8 FL (ref 9.4–12.4)
RBC # BLD AUTO: 4.1 MILL/MM3 (ref 4.2–5.4)
RH FACTOR: NORMAL
SEGMENTED NEUTROPHILS ABSOLUTE COUNT: 8.6 THOU/MM3 (ref 1.8–7.7)
WBC # BLD AUTO: 11.5 THOU/MM3 (ref 4.8–10.8)

## 2024-02-21 PROCEDURE — 51701 INSERT BLADDER CATHETER: CPT

## 2024-02-21 PROCEDURE — 86901 BLOOD TYPING SEROLOGIC RH(D): CPT

## 2024-02-21 PROCEDURE — 86592 SYPHILIS TEST NON-TREP QUAL: CPT

## 2024-02-21 PROCEDURE — 6360000002 HC RX W HCPCS: Performed by: OBSTETRICS & GYNECOLOGY

## 2024-02-21 PROCEDURE — 1220000000 HC SEMI PRIVATE OB R&B

## 2024-02-21 PROCEDURE — 86850 RBC ANTIBODY SCREEN: CPT

## 2024-02-21 PROCEDURE — 86900 BLOOD TYPING SEROLOGIC ABO: CPT

## 2024-02-21 PROCEDURE — 6360000002 HC RX W HCPCS

## 2024-02-21 PROCEDURE — 6370000000 HC RX 637 (ALT 250 FOR IP): Performed by: OBSTETRICS & GYNECOLOGY

## 2024-02-21 PROCEDURE — 10907ZC DRAINAGE OF AMNIOTIC FLUID, THERAPEUTIC FROM PRODUCTS OF CONCEPTION, VIA NATURAL OR ARTIFICIAL OPENING: ICD-10-PCS | Performed by: OBSTETRICS & GYNECOLOGY

## 2024-02-21 PROCEDURE — 2580000003 HC RX 258: Performed by: OBSTETRICS & GYNECOLOGY

## 2024-02-21 PROCEDURE — 80307 DRUG TEST PRSMV CHEM ANLYZR: CPT

## 2024-02-21 PROCEDURE — 3700000025 EPIDURAL BLOCK: Performed by: ANESTHESIOLOGY

## 2024-02-21 PROCEDURE — 85025 COMPLETE CBC W/AUTO DIFF WBC: CPT

## 2024-02-21 PROCEDURE — 6360000002 HC RX W HCPCS: Performed by: ANESTHESIOLOGY

## 2024-02-21 PROCEDURE — 2500000003 HC RX 250 WO HCPCS: Performed by: NURSE ANESTHETIST, CERTIFIED REGISTERED

## 2024-02-21 PROCEDURE — 7200000001 HC VAGINAL DELIVERY

## 2024-02-21 RX ORDER — SEVOFLURANE 250 ML/250ML
1 LIQUID RESPIRATORY (INHALATION) CONTINUOUS PRN
Status: DISCONTINUED | OUTPATIENT
Start: 2024-02-21 | End: 2024-02-21

## 2024-02-21 RX ORDER — FENTANYL CITRATE 50 UG/ML
50 INJECTION, SOLUTION INTRAMUSCULAR; INTRAVENOUS
Status: DISCONTINUED | OUTPATIENT
Start: 2024-02-21 | End: 2024-02-21

## 2024-02-21 RX ORDER — OXYCODONE HYDROCHLORIDE 5 MG/1
5 TABLET ORAL EVERY 4 HOURS PRN
Status: DISCONTINUED | OUTPATIENT
Start: 2024-02-21 | End: 2024-02-22 | Stop reason: HOSPADM

## 2024-02-21 RX ORDER — SODIUM CHLORIDE 0.9 % (FLUSH) 0.9 %
5-40 SYRINGE (ML) INJECTION EVERY 12 HOURS SCHEDULED
Status: DISCONTINUED | OUTPATIENT
Start: 2024-02-21 | End: 2024-02-22

## 2024-02-21 RX ORDER — METHYLERGONOVINE MALEATE 0.2 MG/ML
200 INJECTION INTRAVENOUS PRN
Status: DISCONTINUED | OUTPATIENT
Start: 2024-02-21 | End: 2024-02-21

## 2024-02-21 RX ORDER — IBUPROFEN 800 MG/1
800 TABLET ORAL EVERY 8 HOURS SCHEDULED
Status: DISCONTINUED | OUTPATIENT
Start: 2024-02-21 | End: 2024-02-22 | Stop reason: HOSPADM

## 2024-02-21 RX ORDER — LIDOCAINE HCL/EPINEPHRINE/PF 2%-1:200K
VIAL (ML) INJECTION PRN
Status: DISCONTINUED | OUTPATIENT
Start: 2024-02-21 | End: 2024-02-21 | Stop reason: SDUPTHER

## 2024-02-21 RX ORDER — SODIUM CHLORIDE 0.9 % (FLUSH) 0.9 %
5-40 SYRINGE (ML) INJECTION PRN
Status: DISCONTINUED | OUTPATIENT
Start: 2024-02-21 | End: 2024-02-22 | Stop reason: HOSPADM

## 2024-02-21 RX ORDER — SODIUM CHLORIDE, SODIUM LACTATE, POTASSIUM CHLORIDE, CALCIUM CHLORIDE 600; 310; 30; 20 MG/100ML; MG/100ML; MG/100ML; MG/100ML
INJECTION, SOLUTION INTRAVENOUS CONTINUOUS
Status: DISCONTINUED | OUTPATIENT
Start: 2024-02-21 | End: 2024-02-21

## 2024-02-21 RX ORDER — TRANEXAMIC ACID 10 MG/ML
1000 INJECTION, SOLUTION INTRAVENOUS
Status: DISCONTINUED | OUTPATIENT
Start: 2024-02-21 | End: 2024-02-21

## 2024-02-21 RX ORDER — ACETAMINOPHEN 325 MG/1
650 TABLET ORAL EVERY 4 HOURS PRN
Status: DISCONTINUED | OUTPATIENT
Start: 2024-02-21 | End: 2024-02-21

## 2024-02-21 RX ORDER — PENICILLIN G 3000000 [IU]/50ML
3 INJECTION, SOLUTION INTRAVENOUS EVERY 4 HOURS
Status: DISCONTINUED | OUTPATIENT
Start: 2024-02-21 | End: 2024-02-21

## 2024-02-21 RX ORDER — MORPHINE SULFATE 4 MG/ML
4 INJECTION, SOLUTION INTRAMUSCULAR; INTRAVENOUS
Status: DISCONTINUED | OUTPATIENT
Start: 2024-02-21 | End: 2024-02-21

## 2024-02-21 RX ORDER — LIDOCAINE HYDROCHLORIDE 10 MG/ML
INJECTION, SOLUTION INFILTRATION; PERINEURAL PRN
Status: DISCONTINUED | OUTPATIENT
Start: 2024-02-21 | End: 2024-02-21 | Stop reason: SDUPTHER

## 2024-02-21 RX ORDER — CARBOPROST TROMETHAMINE 250 UG/ML
250 INJECTION, SOLUTION INTRAMUSCULAR PRN
Status: DISCONTINUED | OUTPATIENT
Start: 2024-02-21 | End: 2024-02-21

## 2024-02-21 RX ORDER — OXYTOCIN/0.9 % SODIUM CHLORIDE 30/500 ML
87.3 PLASTIC BAG, INJECTION (ML) INTRAVENOUS PRN
Status: DISCONTINUED | OUTPATIENT
Start: 2024-02-21 | End: 2024-02-21

## 2024-02-21 RX ORDER — NALOXONE HYDROCHLORIDE 0.4 MG/ML
INJECTION, SOLUTION INTRAMUSCULAR; INTRAVENOUS; SUBCUTANEOUS PRN
Status: DISCONTINUED | OUTPATIENT
Start: 2024-02-21 | End: 2024-02-21

## 2024-02-21 RX ORDER — IBUPROFEN 800 MG/1
800 TABLET ORAL EVERY 8 HOURS PRN
Status: DISCONTINUED | OUTPATIENT
Start: 2024-02-21 | End: 2024-02-21

## 2024-02-21 RX ORDER — FERROUS SULFATE 325(65) MG
325 TABLET ORAL
Status: DISCONTINUED | OUTPATIENT
Start: 2024-02-22 | End: 2024-02-22 | Stop reason: HOSPADM

## 2024-02-21 RX ORDER — ACETAMINOPHEN 500 MG
1000 TABLET ORAL EVERY 8 HOURS PRN
Status: DISCONTINUED | OUTPATIENT
Start: 2024-02-21 | End: 2024-02-22 | Stop reason: HOSPADM

## 2024-02-21 RX ORDER — MISOPROSTOL 200 UG/1
1000 TABLET ORAL PRN
Status: COMPLETED | OUTPATIENT
Start: 2024-02-21 | End: 2024-02-21

## 2024-02-21 RX ORDER — MODIFIED LANOLIN
OINTMENT (GRAM) TOPICAL PRN
Status: DISCONTINUED | OUTPATIENT
Start: 2024-02-21 | End: 2024-02-22 | Stop reason: HOSPADM

## 2024-02-21 RX ORDER — DIPHENHYDRAMINE HYDROCHLORIDE 50 MG/ML
25 INJECTION INTRAMUSCULAR; INTRAVENOUS EVERY 4 HOURS PRN
Status: DISCONTINUED | OUTPATIENT
Start: 2024-02-21 | End: 2024-02-21

## 2024-02-21 RX ORDER — SODIUM CHLORIDE, SODIUM LACTATE, POTASSIUM CHLORIDE, AND CALCIUM CHLORIDE .6; .31; .03; .02 G/100ML; G/100ML; G/100ML; G/100ML
500 INJECTION, SOLUTION INTRAVENOUS PRN
Status: DISCONTINUED | OUTPATIENT
Start: 2024-02-21 | End: 2024-02-21

## 2024-02-21 RX ORDER — DIPHENHYDRAMINE HCL 25 MG
25 TABLET ORAL EVERY 6 HOURS PRN
Status: DISCONTINUED | OUTPATIENT
Start: 2024-02-21 | End: 2024-02-22 | Stop reason: HOSPADM

## 2024-02-21 RX ORDER — NALBUPHINE HYDROCHLORIDE 10 MG/ML
5 INJECTION, SOLUTION INTRAMUSCULAR; INTRAVENOUS; SUBCUTANEOUS EVERY 4 HOURS PRN
Status: DISCONTINUED | OUTPATIENT
Start: 2024-02-21 | End: 2024-02-21 | Stop reason: RX

## 2024-02-21 RX ORDER — SODIUM CHLORIDE 9 MG/ML
INJECTION, SOLUTION INTRAVENOUS PRN
Status: DISCONTINUED | OUTPATIENT
Start: 2024-02-21 | End: 2024-02-22 | Stop reason: HOSPADM

## 2024-02-21 RX ORDER — LIDOCAINE HYDROCHLORIDE 10 MG/ML
30 INJECTION, SOLUTION INFILTRATION; PERINEURAL PRN
Status: DISCONTINUED | OUTPATIENT
Start: 2024-02-21 | End: 2024-02-21

## 2024-02-21 RX ORDER — OXYTOCIN/0.9 % SODIUM CHLORIDE 30/500 ML
1 PLASTIC BAG, INJECTION (ML) INTRAVENOUS CONTINUOUS
Status: DISCONTINUED | OUTPATIENT
Start: 2024-02-21 | End: 2024-02-21

## 2024-02-21 RX ORDER — MISOPROSTOL 200 UG/1
1000 TABLET ORAL PRN
Status: DISCONTINUED | OUTPATIENT
Start: 2024-02-21 | End: 2024-02-21

## 2024-02-21 RX ORDER — ONDANSETRON 4 MG/1
8 TABLET, ORALLY DISINTEGRATING ORAL EVERY 8 HOURS PRN
Status: DISCONTINUED | OUTPATIENT
Start: 2024-02-21 | End: 2024-02-22 | Stop reason: HOSPADM

## 2024-02-21 RX ORDER — ONDANSETRON 2 MG/ML
4 INJECTION INTRAMUSCULAR; INTRAVENOUS EVERY 6 HOURS PRN
Status: DISCONTINUED | OUTPATIENT
Start: 2024-02-21 | End: 2024-02-22 | Stop reason: HOSPADM

## 2024-02-21 RX ORDER — OXYCODONE HYDROCHLORIDE 5 MG/1
10 TABLET ORAL EVERY 4 HOURS PRN
Status: DISCONTINUED | OUTPATIENT
Start: 2024-02-21 | End: 2024-02-22 | Stop reason: HOSPADM

## 2024-02-21 RX ORDER — ONDANSETRON 2 MG/ML
4 INJECTION INTRAMUSCULAR; INTRAVENOUS EVERY 6 HOURS PRN
Status: DISCONTINUED | OUTPATIENT
Start: 2024-02-21 | End: 2024-02-21

## 2024-02-21 RX ORDER — METHYLERGONOVINE MALEATE 0.2 MG/ML
200 INJECTION INTRAVENOUS PRN
Status: DISCONTINUED | OUTPATIENT
Start: 2024-02-21 | End: 2024-02-22 | Stop reason: HOSPADM

## 2024-02-21 RX ORDER — TERBUTALINE SULFATE 1 MG/ML
0.25 INJECTION, SOLUTION SUBCUTANEOUS
Status: DISCONTINUED | OUTPATIENT
Start: 2024-02-21 | End: 2024-02-21

## 2024-02-21 RX ORDER — SODIUM CHLORIDE, SODIUM LACTATE, POTASSIUM CHLORIDE, CALCIUM CHLORIDE 600; 310; 30; 20 MG/100ML; MG/100ML; MG/100ML; MG/100ML
INJECTION, SOLUTION INTRAVENOUS CONTINUOUS
Status: DISCONTINUED | OUTPATIENT
Start: 2024-02-21 | End: 2024-02-22 | Stop reason: HOSPADM

## 2024-02-21 RX ORDER — MORPHINE SULFATE 2 MG/ML
2 INJECTION, SOLUTION INTRAMUSCULAR; INTRAVENOUS
Status: DISCONTINUED | OUTPATIENT
Start: 2024-02-21 | End: 2024-02-21

## 2024-02-21 RX ORDER — SODIUM CHLORIDE, SODIUM LACTATE, POTASSIUM CHLORIDE, AND CALCIUM CHLORIDE .6; .31; .03; .02 G/100ML; G/100ML; G/100ML; G/100ML
1000 INJECTION, SOLUTION INTRAVENOUS PRN
Status: DISCONTINUED | OUTPATIENT
Start: 2024-02-21 | End: 2024-02-21

## 2024-02-21 RX ORDER — ZOLPIDEM TARTRATE 5 MG/1
5 TABLET ORAL NIGHTLY PRN
Status: DISCONTINUED | OUTPATIENT
Start: 2024-02-21 | End: 2024-02-22 | Stop reason: HOSPADM

## 2024-02-21 RX ORDER — DOCUSATE SODIUM 100 MG/1
100 CAPSULE, LIQUID FILLED ORAL 2 TIMES DAILY
Status: DISCONTINUED | OUTPATIENT
Start: 2024-02-21 | End: 2024-02-22 | Stop reason: HOSPADM

## 2024-02-21 RX ADMIN — ONDANSETRON 4 MG: 2 INJECTION INTRAMUSCULAR; INTRAVENOUS at 14:30

## 2024-02-21 RX ADMIN — LIDOCAINE HYDROCHLORIDE AND EPINEPHRINE 3 ML: 20; 5 INJECTION, SOLUTION EPIDURAL; INFILTRATION; INTRACAUDAL; PERINEURAL at 15:20

## 2024-02-21 RX ADMIN — SODIUM CHLORIDE, POTASSIUM CHLORIDE, SODIUM LACTATE AND CALCIUM CHLORIDE 1000 ML: 600; 310; 30; 20 INJECTION, SOLUTION INTRAVENOUS at 13:45

## 2024-02-21 RX ADMIN — Medication 10 ML: at 15:24

## 2024-02-21 RX ADMIN — Medication 18 ML/HR: at 15:25

## 2024-02-21 RX ADMIN — SODIUM CHLORIDE, POTASSIUM CHLORIDE, SODIUM LACTATE AND CALCIUM CHLORIDE: 600; 310; 30; 20 INJECTION, SOLUTION INTRAVENOUS at 22:07

## 2024-02-21 RX ADMIN — HYDROMORPHONE HYDROCHLORIDE 1 MG: 1 INJECTION, SOLUTION INTRAMUSCULAR; INTRAVENOUS; SUBCUTANEOUS at 17:30

## 2024-02-21 RX ADMIN — ACETAMINOPHEN 1000 MG: 500 TABLET ORAL at 22:08

## 2024-02-21 RX ADMIN — SODIUM CHLORIDE 5 MILLION UNITS: 900 INJECTION INTRAVENOUS at 14:00

## 2024-02-21 RX ADMIN — LIDOCAINE HYDROCHLORIDE 3 ML: 10 INJECTION, SOLUTION INFILTRATION; PERINEURAL at 15:14

## 2024-02-21 RX ADMIN — SODIUM CHLORIDE, POTASSIUM CHLORIDE, SODIUM LACTATE AND CALCIUM CHLORIDE: 600; 310; 30; 20 INJECTION, SOLUTION INTRAVENOUS at 15:44

## 2024-02-21 RX ADMIN — IBUPROFEN 800 MG: 800 TABLET, FILM COATED ORAL at 20:00

## 2024-02-21 RX ADMIN — SODIUM CHLORIDE, POTASSIUM CHLORIDE, SODIUM LACTATE AND CALCIUM CHLORIDE 1000 ML: 600; 310; 30; 20 INJECTION, SOLUTION INTRAVENOUS at 16:38

## 2024-02-21 RX ADMIN — LIDOCAINE HYDROCHLORIDE AND EPINEPHRINE 3 ML: 20; 5 INJECTION, SOLUTION EPIDURAL; INFILTRATION; INTRACAUDAL; PERINEURAL at 15:23

## 2024-02-21 RX ADMIN — Medication 166.7 ML: at 17:33

## 2024-02-21 RX ADMIN — Medication 87.3 MILLI-UNITS/MIN: at 17:42

## 2024-02-21 RX ADMIN — MISOPROSTOL 1000 MCG: 200 TABLET ORAL at 20:00

## 2024-02-21 ASSESSMENT — PAIN DESCRIPTION - LOCATION
LOCATION: ABDOMEN
LOCATION: PERINEUM;PELVIS

## 2024-02-21 ASSESSMENT — PAIN SCALES - GENERAL
PAINLEVEL_OUTOF10: 2
PAINLEVEL_OUTOF10: 4

## 2024-02-21 ASSESSMENT — PAIN DESCRIPTION - DESCRIPTORS
DESCRIPTORS: DISCOMFORT
DESCRIPTORS: CRAMPING

## 2024-02-21 ASSESSMENT — PAIN DESCRIPTION - ORIENTATION: ORIENTATION: LOWER;MID

## 2024-02-21 ASSESSMENT — PAIN - FUNCTIONAL ASSESSMENT: PAIN_FUNCTIONAL_ASSESSMENT: ACTIVITIES ARE NOT PREVENTED

## 2024-02-21 NOTE — FLOWSHEET NOTE
Dr. Sol sent text, notified she is needed to come for delivery. SCN and RRT notified as well per phone.

## 2024-02-21 NOTE — ANESTHESIA PROCEDURE NOTES
Epidural Block    Patient location during procedure: OB  Start time: 2/21/2024 3:14 PM  End time: 2/21/2024 3:20 PM  Reason for block: labor epidural  Staffing  Performed: resident/CRNA   Anesthesiologist: Odilon Mercado MD  Resident/CRNA: Paulina Cramer  Performed by: Paulina Cramer  Authorized by: Odilon Mercado MD    Epidural  Patient position: sitting  Prep: ChloraPrep  Patient monitoring: continuous pulse ox and frequent blood pressure checks  Approach: midline  Location: L4-5  Injection technique: SUDHA air  Guidance: paresthesia technique  Provider prep: mask and sterile gloves  Needle  Needle type: Tuohy   Needle gauge: 18 G  Needle length: 3.5 in  Needle insertion depth: 7 cm  Catheter type: side hole  Catheter size: 20 G  Catheter at skin depth: 13 cm  Test dose: negative  Kit: [id number]  Lot number: [id number]  Expiration date: 4/30/2025Catheter Secured: tegaderm and tape  Assessment  Hemodynamics: stable  Attempts: 1  Outcomes: patient tolerated procedure well and uncomplicated  Preanesthetic Checklist  Completed: patient identified, IV checked, site marked, risks and benefits discussed, surgical/procedural consents, equipment checked, pre-op evaluation, timeout performed, anesthesia consent given, oxygen available, monitors applied/VS acknowledged, fire risk safety assessment completed and verbalized and blood product R/B/A discussed and consented

## 2024-02-21 NOTE — ANESTHESIA PRE PROCEDURE
Department of Anesthesiology  Preprocedure Note       Name:  Dedra Beaulieu   Age:  33 y.o.  :  1990                                          MRN:  [id number]         Date:  2024      Surgeon: * No surgeons listed *    Procedure: * No procedures listed *    Medications prior to admission:   Prior to Admission medications    Medication Sig Start Date End Date Taking? Authorizing Provider   cyclobenzaprine (FLEXERIL) 10 MG tablet Take 1 tablet by mouth 3 times daily as needed for Muscle spasms  Patient not taking: Reported on 2024   Miguelina Pena DO   pantoprazole (PROTONIX) 20 MG tablet Take 1 tablet by mouth daily  Patient not taking: Reported on 2024    ProviderKalyani MD   Prenatal MV-Min-Fe Fum-FA-DHA (PRENATAL 1 PO) Take by mouth daily    ProviderKalyani MD       Current medications:    Current Facility-Administered Medications   Medication Dose Route Frequency Provider Last Rate Last Admin   • oxytocin (PITOCIN) 30 units in 500 mL infusion  1 joanna-units/min IntraVENous Continuous Sharri Sol MD       • terbutaline (BRETHINE) injection 0.25 mg  0.25 mg SubCUTAneous Once PRN Sharri Sol MD       • lactated ringers IV soln infusion   IntraVENous Continuous Sharri Sol MD       • lactated ringers bolus 500 mL  500 mL IntraVENous PRN Sharri Sol MD        Or   • lactated ringers bolus 1,000 mL  1,000 mL IntraVENous PRN Sharri Sol MD       • lidocaine 1 % injection 30 mL  30 mL Other PRN Sharri Sol MD       • fentaNYL (SUBLIMAZE) injection 50 mcg  50 mcg IntraVENous Q2H PRN Sharri Sol MD       • nitrous oxide 50% inhalation 1 each  1 each Inhalation Continuous PRN Sharri Sol MD       • ondansetron (ZOFRAN) injection 4 mg  4 mg IntraVENous Q6H PRN Sharri Sol MD       • diphenhydrAMINE (BENADRYL) injection 25 mg  25 mg IntraVENous Q4H PRN Sharri Sol MD       • oxytocin (PITOCIN) 30 units in 500 mL infusion  87.3 joanna-units/min

## 2024-02-21 NOTE — H&P
[facility]  History and Physical Update    Pt Name: Dedra Beaulieu  MRN: [id number]  YOB: 1990  Date of evaluation: 2024    [] I have examined the patient and reviewed the H&P/Consult and there are no changes to the patient or plans.    [x] I have examined the patient and reviewed the H&P/Consult and have noted the following changes:  34yo  at 37/5 weeks presented in active labor at 4cm. Pregnancy complicated by some intermittent HTN and Pr/Cr 420mg. Plan to admit for labor. BP normal on admission. GBS + tx with PCN.             Sharri Sol MD,MD  Electronically signed 2024 at 2:30 PM

## 2024-02-21 NOTE — L&D DELIVERY NOTE
Department of Obstetrics and Gynecology   Vaginal Delivery Note at [facility]  Date of Delivery:  2024    Procedure:  Vacuum assisted vaginal delivery and mild Shoulder Dystocia    Surgeon:   RU Sol    Past Medical History:   Past Medical History:   Diagnosis Date    Abnormal Pap smear of cervix     2023    Gestational HTN     with past pregnancy    Hypertension     with past pregnancy    Preeclampsia     with past pregnancy       Anesthesia:  epidural anesthesia    Estimated blood loss:  400ml    Specimen:  Placenta not sent to pathology     Cord blood sent Yes    Complications:  none    Condition:  infant stable to general nursery and mother stable    Details of Procedure:   The patient is a 33 y.o. female at 37w5d   OB History          3    Para   1    Term   0       1    AB   1    Living   1         SAB   1    IAB        Ectopic        Molar        Multiple        Live Births   1             who was admitted for active phase labor. She received the following interventions: ARBOW. The patient progressed well,did receive an epidural, became complete and started to push.     Vacuum Operative Delivery    The decision was made to proceed with Vacuum Delivery  This was done secondary to  suspicion of immediate or potential fetal complications due to decels in the FHT  The cervix was completely dilated, the bladder was empty, the pelvis was thought to be adequate for delivery.   The estimated fetal weight was 7.5lbs.   The patient was verbally consented for the procedure. Included risks, benefits, and alternatives and agreed to the procedure.  The fetal head was at the +2 station and OA  The vacuum was on with suction for 1 contractions with  0  popoffs  The Fetal head was brought to  with the vacuum then delivered over an intact perineum. There was a 30sec delay in the delivery of the anterior shoulder. This was relieved with Aislinn and Gomez maneuver. The infant delivered and was  placed on mother abdomen.Cord was clamped and cut and infant handed off to the waiting nurse for evaluation. The delivery of the placenta was manual removal. The perineum and vagina were explored and no laceration was identified.    Vaginal sweep was performed and there were no sponges retained in the vagina. All sharps were discarded.    Infant Wt:   Information for the patient's :  Mariella Beaulieu [[id number]]           APGARS:     Information for the patient's :  Mariella Beaulieu [[id number]]        Sharri Sol MD

## 2024-02-22 VITALS
TEMPERATURE: 96.9 F | OXYGEN SATURATION: 97 % | HEART RATE: 92 BPM | SYSTOLIC BLOOD PRESSURE: 136 MMHG | RESPIRATION RATE: 16 BRPM | DIASTOLIC BLOOD PRESSURE: 70 MMHG | BODY MASS INDEX: 41.99 KG/M2 | WEIGHT: 252 LBS | HEIGHT: 65 IN

## 2024-02-22 LAB — RPR SER QL: NONREACTIVE

## 2024-02-22 PROCEDURE — 6370000000 HC RX 637 (ALT 250 FOR IP): Performed by: OBSTETRICS & GYNECOLOGY

## 2024-02-22 RX ORDER — IBUPROFEN 800 MG/1
800 TABLET ORAL EVERY 8 HOURS PRN
Qty: 60 TABLET | Refills: 1 | Status: SHIPPED | OUTPATIENT
Start: 2024-02-22

## 2024-02-22 RX ADMIN — DOCUSATE SODIUM 100 MG: 100 CAPSULE, LIQUID FILLED ORAL at 07:19

## 2024-02-22 RX ADMIN — IBUPROFEN 800 MG: 800 TABLET, FILM COATED ORAL at 04:21

## 2024-02-22 RX ADMIN — ACETAMINOPHEN 1000 MG: 500 TABLET ORAL at 07:19

## 2024-02-22 ASSESSMENT — PAIN - FUNCTIONAL ASSESSMENT
PAIN_FUNCTIONAL_ASSESSMENT: ACTIVITIES ARE NOT PREVENTED

## 2024-02-22 ASSESSMENT — PAIN DESCRIPTION - LOCATION
LOCATION: BACK
LOCATION: PERINEUM;PELVIS
LOCATION: BACK

## 2024-02-22 ASSESSMENT — PAIN SCALES - GENERAL
PAINLEVEL_OUTOF10: 3
PAINLEVEL_OUTOF10: 5
PAINLEVEL_OUTOF10: 3

## 2024-02-22 ASSESSMENT — PAIN DESCRIPTION - FREQUENCY: FREQUENCY: CONTINUOUS

## 2024-02-22 ASSESSMENT — PAIN DESCRIPTION - DESCRIPTORS
DESCRIPTORS: SORE
DESCRIPTORS: SORE
DESCRIPTORS: DISCOMFORT

## 2024-02-22 ASSESSMENT — PAIN DESCRIPTION - PAIN TYPE: TYPE: ACUTE PAIN

## 2024-02-22 ASSESSMENT — PAIN DESCRIPTION - ONSET: ONSET: ON-GOING

## 2024-02-22 NOTE — LACTATION NOTE
Lactation provided support on DBF attempt. Baby had just spit up and was sleeping. Education on length of feeds reinforced, monitoring baby's feeding signs, supply and demand. Baby left skin to skin with mom. Discussed pump and insurance with mom at the end of the visit.

## 2024-02-22 NOTE — ANESTHESIA POSTPROCEDURE EVALUATION
Department of Anesthesiology  Postprocedure Note    Patient: Dedra Beaulieu  MRN: [id number]  YOB: 1990  Date of evaluation: 2/22/2024    Procedure Summary       Date: 02/21/24 Room / Location:     Anesthesia Start: 1504 Anesthesia Stop: 1719    Procedure: Labor Analgesia Diagnosis:     Scheduled Providers:  Responsible Provider: Odilon Mercado MD    Anesthesia Type: epidural ASA Status: 2            Anesthesia Type: No value filed.    Kya Phase I: Kya Score: 10    Kya Phase II: Kya Score: 10    Anesthesia Post Evaluation    Patient location during evaluation: bedside  Patient participation: complete - patient participated  Level of consciousness: awake and alert  Airway patency: patent  Nausea & Vomiting: no nausea and no vomiting  Cardiovascular status: hemodynamically stable  Respiratory status: spontaneous ventilation, room air and nonlabored ventilation  Hydration status: stable  Pain management: adequate        No notable events documented.

## 2024-02-22 NOTE — FLOWSHEET NOTE
RN remained at bedside throughout pushing. EFM continuously assessed. Vaginal delivery of viable infant female by Dr. Sol at 1719 with VAD and 30 second shoulder dystocia.

## 2024-02-22 NOTE — FLOWSHEET NOTE
Pt taken to 5B12 via wheelchair with baby in arms, both in stable condition. Report given to MELINDA Garcia.

## 2024-02-22 NOTE — PLAN OF CARE
Problem: Postpartum  Goal: Experiences normal postpartum course  Description:  Postpartum OB-Pregnancy care plan goal which identifies if the mother is experiencing a normal postpartum course  Outcome: Progressing  Flowsheets (Taken 2024)  Experiences Normal Postpartum Course:   Monitor maternal vital signs   Assess uterine involution     Problem: Postpartum  Goal: Appropriate maternal -  bonding  Description:  Postpartum OB-Pregnancy care plan goal which identifies if the mother and  are bonding appropriately  Outcome: Progressing  Note: Bonding with baby, participating in infant care.       Problem: Postpartum  Goal: Establishment of infant feeding pattern  Description:  Postpartum OB-Pregnancy care plan goal which identifies if the mother is establishing a feeding pattern with their   Outcome: Progressing  Flowsheets (Taken 2024)  Establishment of Infant Feeding Pattern: Assess breast/bottle feeding     Problem: Postpartum  Goal: Incisions, wounds, or drain sites healing without S/S of infection  Outcome: Progressing  Flowsheets (Taken 2024)  Incisions, Wounds, or Drain Sites Healing Without Sign and Symptoms of Infection: TWICE DAILY: Assess and document skin integrity     Problem: Pain  Goal: Verbalizes/displays adequate comfort level or baseline comfort level  Outcome: Progressing  Flowsheets  Taken 2024 by Radha Vela RN  Verbalizes/displays adequate comfort level or baseline comfort level: Encourage patient to monitor pain and request assistance       Problem: Infection - Adult  Goal: Absence of infection at discharge  Outcome: Progressing  Flowsheets (Taken 2024)  Absence of infection at discharge:   Assess and monitor for signs and symptoms of infection   Monitor lab/diagnostic results     Problem: Infection - Adult  Goal: Absence of infection during hospitalization  Outcome: Progressing  Flowsheets  Taken 2024 by  Radha Vela RN  Absence of infection during hospitalization:   Assess and monitor for signs and symptoms of infection   Monitor lab/diagnostic results       Problem: Safety - Adult  Goal: Free from fall injury  Outcome: Progressing  Flowsheets  Taken 2/21/2024 2049 by Radha Vela RN  Free From Fall Injury: Instruct family/caregiver on patient safety       Problem: Discharge Planning  Goal: Discharge to home or other facility with appropriate resources  Outcome: Progressing  Flowsheets  Taken 2/21/2024 2049 by Radha Vela RN  Discharge to home or other facility with appropriate resources: Identify barriers to discharge with patient and caregiver       Problem: Chronic Conditions and Co-morbidities  Goal: Patient's chronic conditions and co-morbidity symptoms are monitored and maintained or improved  Outcome: Progressing  Flowsheets (Taken 2/21/2024 2049)  Care Plan - Patient's Chronic Conditions and Co-Morbidity Symptoms are Monitored and Maintained or Improved: Monitor and assess patient's chronic conditions and comorbid symptoms for stability, deterioration, or improvement   Care plan reviewed with patient and she contributes to goal setting and voices understanding of plan of care.

## 2024-02-22 NOTE — DISCHARGE SUMMARY
Vaginal Delivery Discharge Summary    Gestational Age:37w5d    Antepartum complications: none    Admission date: 2024 12:54 PM      Type of Delivery:      [place] Data  Information for the patient's :  Mariella Beaulieu [[id number]]   female   Birth Weight: 3.52 kg (7 lb 12.2 oz)     Labs: CBC   Lab Results   Component Value Date    HGB 12.3 2024    HCT 36.7 (L) 2024        Intrapartum complications: Mild shoulder dystocia    Postpartum complications: none    The patient is ambulating well. The patient is tolerating a normal diet.      Patient Instructions:   Activity: activity as tolerated and no sex for 6 weeks  Diet: regular  Wound Care: ice to area for comfort    Discharge Information  Current Discharge Medication List        START taking these medications    Details   ibuprofen (ADVIL;MOTRIN) 800 MG tablet Take 1 tablet by mouth every 8 hours as needed for Pain  Qty: 60 tablet, Refills: 1           CONTINUE these medications which have NOT CHANGED    Details   Prenatal MV-Min-Fe Fum-FA-DHA (PRENATAL 1 PO) Take by mouth daily           STOP taking these medications       cyclobenzaprine (FLEXERIL) 10 MG tablet Comments:   Reason for Stopping:         pantoprazole (PROTONIX) 20 MG tablet Comments:   Reason for Stopping:               No discharge procedures on file.    Condition: Good    Plan:   Follow up in 6 week(s)    Electronically signed by Miguelina Pena DO on 2024 at 8:20 AM

## 2024-02-22 NOTE — FLOWSHEET NOTE
Malena care done, clean pads provided. Dermoplast, tucks pads and ice pack applied to perineum for comfort.

## 2024-02-22 NOTE — FLOWSHEET NOTE
Fetal monitor applied to soft abdomen and the fetal heart tones are audible at 150 in the LLQ.  Mother at side. To right side and states not sure is having pre eclampsia due to CMP and urine has protein in it from 02-.

## 2024-02-22 NOTE — PLAN OF CARE
Problem: Pain  Goal: Verbalizes/displays adequate comfort level or baseline comfort level  Recent Flowsheet Documentation  Taken 2024 by oNhemi Neves RN  Verbalizes/displays adequate comfort level or baseline comfort level:   Encourage patient to monitor pain and request assistance   Administer analgesics based on type and severity of pain and evaluate response   Assess pain using appropriate pain scale   Implement non-pharmacological measures as appropriate and evaluate response     Problem: Infection - Adult  Goal: Absence of infection during hospitalization  Recent Flowsheet Documentation  Taken 2024 by Nohemi Neves RN  Absence of infection during hospitalization:   Assess and monitor for signs and symptoms of infection   Monitor lab/diagnostic results   Instruct and encourage patient and family to use good hand hygiene technique     Problem: Safety - Adult  Goal: Free from fall injury  Recent Flowsheet Documentation  Taken 2024 by Nohemi Neves RN  Free From Fall Injury: Instruct family/caregiver on patient safety     Problem: Discharge Planning  Goal: Discharge to home or other facility with appropriate resources  Recent Flowsheet Documentation  Taken 2024 by Nohemi Neves RN  Discharge to home or other facility with appropriate resources: Identify barriers to discharge with patient and caregiver     Problem: Postpartum  Goal: Experiences normal postpartum course  Description:  Postpartum OB-Pregnancy care plan goal which identifies if the mother is experiencing a normal postpartum course  Outcome: Progressing  Flowsheets (Taken 2024)  Experiences Normal Postpartum Course:   Monitor maternal vital signs   Assess uterine involution     Problem: Postpartum  Goal: Appropriate maternal -  bonding  Description:  Postpartum OB-Pregnancy care plan goal which identifies if the mother and  are bonding appropriately  Outcome:

## 2024-02-22 NOTE — FLOWSHEET NOTE
Epidural line stopped and there is 73.5  ml's infused and 191.5 ml's remains and wasted with RU Hamlin RN.

## 2024-02-22 NOTE — FLOWSHEET NOTE
Postpartum education brochure given, teaching complete. [place] postpartum depression screening discussed with patient. Patient instructed to complete [place] postpartum depression screening in 2 weeks and contact her healthcare provider if her score is > 10. Patient voiced understanding.     Reviewed postpartum birth warning signs flyer with patient. Patient has voiced understanding of teaching. Discharge teaching and instructions for diagnosis/procedure of vaginal delivery completed with patient using teachback method. AVS reviewed. Patient voiced understanding regarding  follow up appointments, and care of self at home. Discharged in a wheelchair to  home with support per family.Mother's blood type is AB+.

## 2024-02-22 NOTE — FLOWSHEET NOTE
3 para 1 EDC is 2024 37 8/7 weeks arrived by wheelchair in labor and delivery with contractions.  Mother at side.  Weighed and then ambulated to room 5c08.  Changed into a gown and urine for drug screen obtained after discussed and agreeable.

## 2024-02-22 NOTE — FLOWSHEET NOTE
1653 to 1719 Patient actively pushing. RN remains in continuous attendance at the bedside. Assessment & evaluation of fetal heart rate and contraction pattern ongoing by RN via continuous EFM.

## 2024-02-22 NOTE — PROGRESS NOTES
Department of Obstetrics and Gynecology  Labor and Delivery  Attending Post Partum Progress Note    PPD #1    SUBJECTIVE: Feeling well. Complains of some pelvic pain, as to be expected. Bleeding wnl. Voiding spontaneously. Ambulating without lightheadedness.  May want to go home later today.    OBJECTIVE:     Vitals:  BP (!) 117/51   Pulse 80   Temp 97.5 °F (36.4 °C) (Oral)   Resp 16   Ht 1.651 m (5' 5\")   Wt 114.3 kg (252 lb)   SpO2 97%   Breastfeeding Unknown   BMI 41.93 kg/m²     Uterus:  normal size, well involuted, firm, non-tender    DATA:    Hemoglobin:   Lab Results   Component Value Date/Time    HGB 12.3 02/21/2024 02:00 PM       ASSESSMENT & PLAN:    Doing well.   Discharge home today or tomorrow.    Miguelina Pena, DO 2/22/2024

## 2024-02-22 NOTE — FLOWSHEET NOTE
Pt to unit from L&D via wheelchair. Pt oriented to call light, room, and unit. [place] show done at this time.

## 2024-02-22 NOTE — FLOWSHEET NOTE
Dr Sol updated on pt's vaginal bleeding. RN expressed 455ml with fundal massage. Fundus firm now. Pt doesn't tolerate massage well and makes RN stop. Order received to give cytotec. Will also medicate with motrin for pain relief.

## 2024-02-22 NOTE — DISCHARGE INSTRUCTIONS
DISCHARGE INSTRUCTIONS FOR  PATIENTS AND FAMILY      Discharge Instructions for Labor and Delivery, Vaginal Birth     A vaginal birth refers to the baby being delivered through the vagina. The amount of time that labor can take varies greatly. Labor for the average first-born baby is about 16 hours.   Usually your hospital stay after a routine delivery is no more than two nights. Some new mothers go home the same day. Recovery from childbirth varies depending upon whether you had an episiotomy (an incision in the perineum, the area between your vaginal opening and your anus), the duration of labor and delivery, and the amount of rest you get.   In general, it takes about 6-8 weeks for a woman's body to recover from childbirth.   What You Will Need   Along with your medications, you will need the following:   Sanitary pads    Nursing pads    Witch hazel pads    Possibly a Sitz bath        Home Care    You will want to arrange for transportation home for you and your baby. The baby will need a car seat. You will receive instructions in the hospital for breastfeeding and taking care of the perineum area. You may use ointment for cracked nipples or warm water rinses to your perineum.   You will need to wear sanitary pads for about six weeks after delivery.    If you had an episiotomy or vaginal tear, you will be sent home with a plastic squirt bottle. Fill it with warm water and squirt over the vaginal and anal area every time you urinate and defecate.    Warm baths can be soothing to healing tissues.    Apply warm or cold cloths to sore breasts.    Apply ointment to cracked nipples.    Use nursing pads for leaky breast.    Apply witch hazel pads to sore perineum (area between vagina and anus).    Ask your doctor about when it is safe for you to shower or bathe.    Sit in a sitz bath to soothe sore perineum and/or hemorrhoids. A sitz bath is soaking the hip and buttocks area in warm water. You can buy a plastic sitz  sitz bath or soak in a clean tub as needed for comfort.    Kegel exercises will help restore bladder control.     SWELLING  Try to keep your legs elevated when you are sitting.     When lying down keep your legs elevated.    When wearing stocking or socks, make sure they are not too tight.    WHEN TO CALL THE DOCTOR  If you have a temp of 100.4 or more.     If your bleeding has increased and you are saturating a pad in an hour.    Your abdomen is tender to touch.    You are passing blood clots bigger than the size of a lemon.    If you are experiencing extreme weakness or dizziness.     If you are having flu-like symptoms such as achy muscles or joints.    There is a foul smell or a green color to your vaginal bleeding.    If you have pain that cannot be relieved with Tylenol or Motrin.    You have persistent burning with urination or frequency.     Call if you have concerns about your well-being.    You are unable to sleep, eat, or are having thoughts of harming yourself or your baby. Call you OB provider if your depression score is greater than 10.    You have swelling, bleeding, drainage, foul odor, redness, or warmth in/around your incision or stitches.    You have a red, warm, tender area in you calf. Please contact your OB physician right way.         Please refer to your \"Guide for New Mothers \" Binder for  further information of caring for yourself & your baby.      Follow-up with your OB doctor as specified.    For Breastfeeding moms, you can contact our lactation specialists with  any problems or questions you may have.  Contact our Lactation Consultants at [phone]. Please feel free to leave a message and they will return your call.

## 2024-02-22 NOTE — PLAN OF CARE
Problem: Pain  Goal: Verbalizes/displays adequate comfort level or baseline comfort level  Outcome: Progressing  Flowsheets (Taken 2024)  Verbalizes/displays adequate comfort level or baseline comfort level:   Encourage patient to monitor pain and request assistance   Administer analgesics based on type and severity of pain and evaluate response   Consider cultural and social influences on pain and pain management   Assess pain using appropriate pain scale   Implement non-pharmacological measures as appropriate and evaluate response     Problem: Vaginal Birth or  Section  Goal: Fetal and maternal status remain reassuring during the birth process  Description:  Birth OB-Pregnancy care plan goal which identifies if the fetal and maternal status remain reassuring during the birth process  Outcome: Progressing  Flowsheets (Taken 2024)  Fetal and Maternal Status Remain Reassuring During the Birth Process:   Monitor vital signs   Monitor fetal heart rate   Monitor uterine activity   Monitor labor progression (Vaginal delivery)     Problem: Infection - Adult  Goal: Absence of infection during hospitalization  Outcome: Progressing  Flowsheets (Taken 2024)  Absence of infection during hospitalization:   Assess and monitor for signs and symptoms of infection   Monitor lab/diagnostic results   Monitor all insertion sites i.e., indwelling lines, tubes and drains   Monitor endotracheal (as able) and nasal secretions for changes in amount and color   [place] appropriate cooling/warming therapies per order   Administer medications as ordered   Instruct and encourage patient and family to use good hand hygiene technique   Identify and instruct in appropriate isolation precautions for identified infection/condition     Problem: Safety - Adult  Goal: Free from fall injury  Outcome: Progressing  Flowsheets (Taken 2024)  Free From Fall Injury: Instruct family/caregiver on patient

## 2024-02-22 NOTE — FLOWSHEET NOTE
Up to BR with assist to void large amount, Pericare instructions given, voices understanding, Fundus firm , midline, U/1 after voiding, small amount of bleeding noted.

## 2024-02-23 ENCOUNTER — TELEPHONE (OUTPATIENT)
Dept: FAMILY MEDICINE CLINIC | Age: 34
End: 2024-02-23

## 2024-02-23 NOTE — TELEPHONE ENCOUNTER
Care Transitions Initial Follow Up Call    Outreach made within 2 business days of discharge: Yes    Patient: Dedra Beaulieu Patient : 1990   MRN: [id number]  Reason for Admission: There are no discharge diagnoses documented for the most recent discharge.  Discharge Date: 24       Spoke with: Patient    Discharge department/facility: [facility]    TCM Interactive Patient Contact:  Was patient able to fill all prescriptions: Yes  Was patient instructed to bring all medications to the follow-up visit: Yes  Is patient taking all medications as directed in the discharge summary? Yes  Does patient understand their discharge instructions: Yes  Does patient have questions or concerns that need addressed prior to 7-14 day follow up office visit: no    Scheduled appointment with PCP within 7-14 days    Follow Up  Future Appointments   Date Time Provider Department Center   3/18/2024  1:30 PM Iliana Mcgraw, BELKIS - CNP [place] [facility] [place]   4/15/2024  8:20 AM Kati He DO SRPX [facility] [place]       Ni Ying RCP

## 2024-02-23 NOTE — FLOWSHEET NOTE
Patient discharged with baby in arms to personal vehicle with significant other and personal belongings.  Infant secured in car seat.

## 2024-02-23 NOTE — TELEPHONE ENCOUNTER
Care Transitions Initial Follow Up Call    Outreach made within 2 business days of discharge: Yes    Patient: Dedra Beaulieu Patient : 1990   MRN: [id number]  Reason for Admission: There are no discharge diagnoses documented for the most recent discharge.  Discharge Date: 24       Spoke with: Left  for pt to return call.    Discharge department/facility: [facility]

## 2024-03-28 ENCOUNTER — TELEPHONE (OUTPATIENT)
Dept: FAMILY MEDICINE CLINIC | Age: 34
End: 2024-03-28

## 2024-03-28 NOTE — TELEPHONE ENCOUNTER
Patient has appt with me 4/15 at 820 AM and daughter has appt 4/17 with Dr Salazar. If it is easier for her I can see both on 4/17 in my 4pm and 420pm appts otherwise OK to keep as is. Thanks!    Electronically signed by Kati He DO on 3/28/2024 at 6:47 PM

## 2024-03-29 ENCOUNTER — OFFICE VISIT (OUTPATIENT)
Dept: UROLOGY | Age: 34
End: 2024-03-29
Payer: COMMERCIAL

## 2024-03-29 VITALS
BODY MASS INDEX: 36.82 KG/M2 | HEIGHT: 65 IN | DIASTOLIC BLOOD PRESSURE: 70 MMHG | WEIGHT: 221 LBS | SYSTOLIC BLOOD PRESSURE: 130 MMHG

## 2024-03-29 DIAGNOSIS — R10.9 FLANK PAIN: Primary | ICD-10-CM

## 2024-03-29 LAB
BILIRUBIN URINE: NEGATIVE
BLOOD URINE, POC: NEGATIVE
CHARACTER, URINE: ABNORMAL
COLOR, URINE: ABNORMAL
GLUCOSE URINE: NEGATIVE MG/DL
KETONES, URINE: NEGATIVE
LEUKOCYTE CLUMPS, URINE: ABNORMAL
NITRITE, URINE: NEGATIVE
PH, URINE: 5.5 (ref 5–9)
PROTEIN, URINE: NEGATIVE MG/DL
SPECIFIC GRAVITY, URINE: >= 1.03 (ref 1–1.03)
UROBILINOGEN, URINE: 0.2 EU/DL (ref 0–1)

## 2024-03-29 PROCEDURE — 1036F TOBACCO NON-USER: CPT | Performed by: NURSE PRACTITIONER

## 2024-03-29 PROCEDURE — 81003 URINALYSIS AUTO W/O SCOPE: CPT | Performed by: NURSE PRACTITIONER

## 2024-03-29 PROCEDURE — 99202 OFFICE O/P NEW SF 15 MIN: CPT | Performed by: NURSE PRACTITIONER

## 2024-03-29 PROCEDURE — G8427 DOCREV CUR MEDS BY ELIG CLIN: HCPCS | Performed by: NURSE PRACTITIONER

## 2024-03-29 PROCEDURE — G8417 CALC BMI ABV UP PARAM F/U: HCPCS | Performed by: NURSE PRACTITIONER

## 2024-03-29 PROCEDURE — G8484 FLU IMMUNIZE NO ADMIN: HCPCS | Performed by: NURSE PRACTITIONER

## 2024-03-29 ASSESSMENT — ENCOUNTER SYMPTOMS
COUGH: 0
ABDOMINAL PAIN: 0
SHORTNESS OF BREATH: 0

## 2024-03-29 NOTE — PROGRESS NOTES
[facility] [place] [facility]  [facility]  [address]  [address]  [facility] [address]  Dept: [phone]  Loc: [phone]    Visit Date: 3/29/2024        HPI:   Dedra is a 33 year old female who was seen at [facility] 1/10/24 due to left flank pain. MRI without stone or hydro. Pain was unlikely  related.  GARO normal.  UA with 5-10 RBC, neg nitrites, and neg leuk.  CRT at baseline. She was to follow up after delivery if still having pain.     UA 1/15/24 mod blood.  RBC 10-15.  WBC 5-9.  Neh leuk,, neg nitrites.    She is seen today 3/29/24. SHe had her baby 2/21/24.    UA small leuk.  Neg nitrites.  Eg blood.   Deneis any abdominal pain/flank pain since delivery.   NO dysuria.  No hemeturia.  No urinary urgency and frequency  No fevers, chills.     NO family hx bladder cancer  No personal hx tobacco use  No personal hx kidney stones.     Current Outpatient Medications   Medication Sig Dispense Refill    Nutritional Supplements (VITAMIN D BOOSTER PO) Take by mouth      Prenatal MV-Min-Fe Fum-FA-DHA (PRENATAL 1 PO) Take by mouth daily      ibuprofen (ADVIL;MOTRIN) 800 MG tablet Take 1 tablet by mouth every 8 hours as needed for Pain (Patient not taking: Reported on 3/29/2024) 60 tablet 1     No current facility-administered medications for this visit.       Past Medical History  Dedra  has a past medical history of Abnormal Pap smear of cervix, Gestational HTN, Hypertension, and Preeclampsia.    Past Surgical History  The patient  has a past surgical history that includes Dilation and curettage of uterus (N/A, 2/26/2023).    Family History  This patient's family history includes Depression in her mother; Diabetes in her maternal grandmother; High Blood Pressure in her maternal grandmother.    Social History  Dedra  reports that she has never smoked. She has never been exposed to tobacco smoke. She has never used smokeless tobacco. She reports that she does not currently use alcohol.

## 2024-03-30 LAB
BACTERIA UR CULT: ABNORMAL
ORGANISM: ABNORMAL

## 2024-04-17 ENCOUNTER — OFFICE VISIT (OUTPATIENT)
Dept: FAMILY MEDICINE CLINIC | Age: 34
End: 2024-04-17
Payer: COMMERCIAL

## 2024-04-17 VITALS
DIASTOLIC BLOOD PRESSURE: 72 MMHG | BODY MASS INDEX: 36.89 KG/M2 | TEMPERATURE: 97.9 F | HEART RATE: 64 BPM | RESPIRATION RATE: 21 BRPM | HEIGHT: 65 IN | SYSTOLIC BLOOD PRESSURE: 108 MMHG | OXYGEN SATURATION: 98 % | WEIGHT: 221.4 LBS

## 2024-04-17 DIAGNOSIS — D17.1 LIPOMA OF TORSO: Primary | ICD-10-CM

## 2024-04-17 PROCEDURE — 1036F TOBACCO NON-USER: CPT

## 2024-04-17 PROCEDURE — G8427 DOCREV CUR MEDS BY ELIG CLIN: HCPCS

## 2024-04-17 PROCEDURE — 99212 OFFICE O/P EST SF 10 MIN: CPT

## 2024-04-17 PROCEDURE — G8417 CALC BMI ABV UP PARAM F/U: HCPCS

## 2024-04-17 ASSESSMENT — ENCOUNTER SYMPTOMS
WHEEZING: 0
SORE THROAT: 0
ABDOMINAL DISTENTION: 0
PHOTOPHOBIA: 0
RHINORRHEA: 0
COUGH: 0
DIARRHEA: 0
TROUBLE SWALLOWING: 0
CONSTIPATION: 0
EYE PAIN: 0
NAUSEA: 0
ABDOMINAL PAIN: 0
BACK PAIN: 0
SHORTNESS OF BREATH: 0
VOMITING: 0

## 2024-04-18 NOTE — PROGRESS NOTES
Medical Student Note    2024    Dedra Beaulieu (:  1990) is a 33 y.o. female with no pertinent PMHx, here for f/u of a cyst. She c/o a cyst at the right upper back onset x3 months ago that is non-painful. She has never experienced this before. She states that the cyst has not changed in size. She denies neck pain/stiffness or back pain. She further denies any arm or leg weakness/numbness. No pain with truncal twisting.    Subjective   Patient Active Problem List   Diagnosis    Vaginal bleeding    Flank pain    Pregnancy complication, antepartum    Sludge in gallbladder    Intrauterine pregnancy    Right upper quadrant pain    Vaginal delivery       Review of Systems   Constitutional:  Negative for chills and fever.   HENT:  Negative for sore throat and trouble swallowing.    Eyes:  Negative for pain and visual disturbance.   Respiratory:  Negative for cough and shortness of breath.    Cardiovascular:  Negative for chest pain and palpitations.   Gastrointestinal:  Negative for abdominal pain, nausea and vomiting.   Genitourinary:  Negative for difficulty urinating and dysuria.   Musculoskeletal:  Negative for back pain, neck pain and neck stiffness.   Skin:  Negative for rash and wound.   Neurological:  Negative for weakness, numbness and headaches.       Prior to Visit Medications    Medication Sig Taking? Authorizing Provider   Nutritional Supplements (VITAMIN D BOOSTER PO) Take by mouth Yes Provider, MD Kalyani   Prenatal MV-Min-Fe Fum-FA-DHA (PRENATAL 1 PO) Take by mouth daily Yes ProviderKalyani MD   ibuprofen (ADVIL;MOTRIN) 800 MG tablet Take 1 tablet by mouth every 8 hours as needed for Pain  Patient not taking: Reported on 3/29/2024  Miguelina Pena DO        No Known Allergies    Past Medical History:   Diagnosis Date    Abnormal Pap smear of cervix     2023    Gestational HTN     with past pregnancy    Hypertension     with past pregnancy    Preeclampsia     with past 
Attending Physician Note    I reviewed the patient's medical history, the resident's findings on physical examination, the patient's diagnoses, and treatment plan as documented in the resident note.  I concur with the treatment plan as documented.  Any additional suggestions noted below.    GE modifier        
dizziness, numbness and headaches.   Psychiatric/Behavioral:  Negative for dysphoric mood. The patient is not nervous/anxious.        OBJECTIVE     /72 (Site: Left Upper Arm, Position: Sitting, Cuff Size: Medium Adult)   Pulse 64   Temp 97.9 °F (36.6 °C) (Oral)   Resp 21   Ht 1.651 m (5' 5\")   Wt 100.4 kg (221 lb 6.4 oz)   SpO2 98%   BMI 36.84 kg/m²   Body mass index is 36.84 kg/m².  BP Readings from Last 3 Encounters:   04/17/24 108/72   03/29/24 130/70   02/22/24 136/70         Physical Exam  Constitutional:       General: She is not in acute distress.     Appearance: She is normal weight.   HENT:      Head: Normocephalic and atraumatic.      Right Ear: External ear normal.      Left Ear: External ear normal.      Nose: No congestion or rhinorrhea.      Mouth/Throat:      Mouth: Mucous membranes are moist.      Pharynx: Oropharynx is clear.   Eyes:      General: No scleral icterus.     Extraocular Movements: Extraocular movements intact.      Conjunctiva/sclera: Conjunctivae normal.   Cardiovascular:      Rate and Rhythm: Normal rate and regular rhythm.      Heart sounds: Normal heart sounds.   Pulmonary:      Effort: Pulmonary effort is normal. No respiratory distress.      Breath sounds: Normal breath sounds. No wheezing, rhonchi or rales.   Abdominal:      General: Abdomen is flat. Bowel sounds are normal. There is no distension.      Palpations: Abdomen is soft. There is no mass.      Tenderness: There is no abdominal tenderness. There is no right CVA tenderness, left CVA tenderness or guarding.   Musculoskeletal:         General: No swelling. Normal range of motion.      Cervical back: Normal range of motion and neck supple.      Right lower leg: No edema.      Left lower leg: No edema.   Skin:     General: Skin is warm and dry.      Findings: Lesion (~3x1 in lesion on r mid back, soft, mobile, nontender) present.   Neurological:      General: No focal deficit present.      Mental Status: She is

## 2025-04-10 ENCOUNTER — LAB (OUTPATIENT)
Dept: LAB | Age: 35
End: 2025-04-10

## 2025-04-22 LAB — CYTOLOGY THIN PREP PAP: NORMAL
